# Patient Record
Sex: FEMALE | Race: WHITE | Employment: FULL TIME | URBAN - METROPOLITAN AREA
[De-identification: names, ages, dates, MRNs, and addresses within clinical notes are randomized per-mention and may not be internally consistent; named-entity substitution may affect disease eponyms.]

---

## 2018-12-19 ENCOUNTER — APPOINTMENT (OUTPATIENT)
Dept: CT IMAGING | Age: 58
End: 2018-12-19
Attending: EMERGENCY MEDICINE
Payer: COMMERCIAL

## 2018-12-19 ENCOUNTER — HOSPITAL ENCOUNTER (EMERGENCY)
Age: 58
Discharge: HOME | End: 2018-12-19
Attending: EMERGENCY MEDICINE
Payer: COMMERCIAL

## 2018-12-19 VITALS
SYSTOLIC BLOOD PRESSURE: 162 MMHG | RESPIRATION RATE: 18 BRPM | OXYGEN SATURATION: 99 % | HEART RATE: 70 BPM | BODY MASS INDEX: 25 KG/M2 | WEIGHT: 170 LBS | DIASTOLIC BLOOD PRESSURE: 87 MMHG | TEMPERATURE: 98 F

## 2018-12-19 DIAGNOSIS — S09.90XA MINOR HEAD INJURY, INITIAL ENCOUNTER: Primary | ICD-10-CM

## 2018-12-19 DIAGNOSIS — S06.0X0A CONCUSSION WITHOUT LOSS OF CONSCIOUSNESS, INITIAL ENCOUNTER: ICD-10-CM

## 2018-12-19 PROCEDURE — 99285 EMERGENCY DEPT VISIT HI MDM: CPT

## 2018-12-19 PROCEDURE — 99284 EMERGENCY DEPT VISIT MOD MDM: CPT

## 2018-12-19 PROCEDURE — 93005 ELECTROCARDIOGRAM TRACING: CPT

## 2018-12-19 PROCEDURE — 93010 ELECTROCARDIOGRAM REPORT: CPT

## 2018-12-19 PROCEDURE — 70450 CT HEAD/BRAIN W/O DYE: CPT | Performed by: EMERGENCY MEDICINE

## 2018-12-19 NOTE — ED PROVIDER NOTES
Patient Seen in: [facility] In [place]    History   Patient presents with:  Head Neck Injury (neurologic, musculoskeletal)    Stated Complaint: passed out Monday morning while in the bathroom- hit head on the bathroom floor*    HPI Used    Alcohol use: No    Drug use: No      Review of Systems    Positive for stated complaint: passed out Monday morning while in the bathroom- hit head on the bathroom floor*  Other systems are as noted in HPI. Constitutional and vital signs reviewed. woke up early in the morning with nausea and had to throw up. She had a brief loss of consciousness while running to the bathroom that I suspect was probably orthostatic hypotension she basically just got out of bed and ran from the bathroom.   She had 6 h

## 2018-12-19 NOTE — ED INITIAL ASSESSMENT (HPI)
States on Monday awoke with nausea and vomiting, got up to bathroom and passed out and struck toilet seat and floor, out for a few seconds but since had more vomiting that day but now you have headache, visual changes blurring of vision and just not feelin

## 2019-04-23 ENCOUNTER — NURSE ONLY (OUTPATIENT)
Dept: FAMILY MEDICINE CLINIC | Facility: CLINIC | Age: 59
End: 2019-04-23
Payer: COMMERCIAL

## 2019-04-23 VITALS
RESPIRATION RATE: 16 BRPM | WEIGHT: 173.81 LBS | HEART RATE: 84 BPM | OXYGEN SATURATION: 98 % | DIASTOLIC BLOOD PRESSURE: 84 MMHG | TEMPERATURE: 98 F | SYSTOLIC BLOOD PRESSURE: 130 MMHG | HEIGHT: 69 IN | BODY MASS INDEX: 25.74 KG/M2

## 2019-04-23 DIAGNOSIS — M54.2 NECK PAIN: Primary | ICD-10-CM

## 2019-04-23 PROCEDURE — 99213 OFFICE O/P EST LOW 20 MIN: CPT | Performed by: NURSE PRACTITIONER

## 2019-04-23 RX ORDER — GLIMEPIRIDE 1 MG/1
TABLET ORAL
Refills: 12 | COMMUNITY
Start: 2019-03-23

## 2019-04-23 RX ORDER — CYCLOBENZAPRINE HCL 10 MG
10 TABLET ORAL 3 TIMES DAILY PRN
Qty: 20 TABLET | Refills: 0 | Status: SHIPPED | OUTPATIENT
Start: 2019-04-23 | End: 2019-04-30

## 2019-04-23 NOTE — PATIENT INSTRUCTIONS
General Neck and Back Pain    Both neck and back pain are usually caused by injury to the muscles or ligaments of the spine. Sometimes the disks that separate each bone of the spine may cause pain by pressing on a nearby nerve.  Back and neck pain may rafaela · Poor conditioning, lack of regular exercise  · Spinal disc disease or arthritis  · Stress  · Pregnancy, or illness like appendicitis, bladder or kidney infection, pelvic infections   Home care  · For neck pain: Use a comfortable pillow that supports the · You may use over-the-counter medicine to control pain, unless another pain medicine was prescribed. If you have chronic conditions like diabetes, liver or kidney disease, stomach ulcers,  gastrointestinal bleeding, or are taking blood thinner medicines.

## 2019-04-23 NOTE — PROGRESS NOTES
CHIEF COMPLAINT:     Patient presents with:  Neck Pain: x 18 days      HPI:   Taylor Dong is a 63year old female who is here for complaints of neck pain. Pain is located at bilateral shoulder. Pain is described as dull. Severity is moderate.  The pain • Thyroid disease    • Type II or unspecified type diabetes mellitus without mention of complication, not stated as uncontrolled     [id number]   • Visual impairment       Social History:  Social History    Tobacco Use      Smoking status: Current Some Day Sm • Cyclobenzaprine HCl 10 MG Oral Tab 20 tablet 0     Sig: Take 1 tablet (10 mg total) by mouth 3 (three) times daily as needed for Muscle spasms.          Patient Instructions     General Neck and Back Pain    Both neck and back pain are usually caused by i · Overexertion, lifting, pushing, pulling incorrectly or too aggressively. · Sudden twisting, bending or stretching from an accident (car or fall), or accidental movement.   · Poor posture  · Poor conditioning, lack of regular exercise  · Spinal disc disea · You may use over-the-counter medicine to control pain, unless another pain medicine was prescribed. If you have chronic conditions like diabetes, liver or kidney disease, stomach ulcers,  gastrointestinal bleeding, or are taking blood thinner medicines.

## 2019-07-17 ENCOUNTER — OFFICE VISIT (OUTPATIENT)
Dept: OBGYN CLINIC | Facility: CLINIC | Age: 59
End: 2019-07-17
Payer: COMMERCIAL

## 2019-07-17 VITALS
HEIGHT: 68 IN | WEIGHT: 171 LBS | DIASTOLIC BLOOD PRESSURE: 70 MMHG | SYSTOLIC BLOOD PRESSURE: 118 MMHG | BODY MASS INDEX: 25.91 KG/M2 | HEART RATE: 76 BPM

## 2019-07-17 DIAGNOSIS — Z12.39 BREAST CANCER SCREENING: ICD-10-CM

## 2019-07-17 DIAGNOSIS — L90.0 LICHEN SCLEROSUS: ICD-10-CM

## 2019-07-17 DIAGNOSIS — Z01.419 WELL FEMALE EXAM WITH ROUTINE GYNECOLOGICAL EXAM: ICD-10-CM

## 2019-07-17 DIAGNOSIS — R39.89 URINE TROUBLES: Primary | ICD-10-CM

## 2019-07-17 DIAGNOSIS — Z12.4 CERVICAL CANCER SCREENING: ICD-10-CM

## 2019-07-17 LAB
APPEARANCE: CLEAR
BILIRUBIN: NEGATIVE
GLUCOSE (URINE DIPSTICK): 1000 MG/DL
KETONES (URINE DIPSTICK): NEGATIVE MG/DL
LEUKOCYTES: NEGATIVE
MULTISTIX LOT#: NORMAL NUMERIC
NITRITE, URINE: NEGATIVE
OCCULT BLOOD: NEGATIVE
PH, URINE: 5 (ref 4.5–8)
PROTEIN (URINE DIPSTICK): NEGATIVE MG/DL
SPECIFIC GRAVITY: 1.03 (ref 1–1.03)
UROBILINOGEN,SEMI-QN: 0.2 MG/DL (ref 0–1.9)

## 2019-07-17 PROCEDURE — 81002 URINALYSIS NONAUTO W/O SCOPE: CPT | Performed by: OBSTETRICS & GYNECOLOGY

## 2019-07-17 PROCEDURE — 99386 PREV VISIT NEW AGE 40-64: CPT | Performed by: OBSTETRICS & GYNECOLOGY

## 2019-07-17 PROCEDURE — 87086 URINE CULTURE/COLONY COUNT: CPT | Performed by: OBSTETRICS & GYNECOLOGY

## 2019-07-17 PROCEDURE — 88175 CYTOPATH C/V AUTO FLUID REDO: CPT | Performed by: OBSTETRICS & GYNECOLOGY

## 2019-07-17 PROCEDURE — 87624 HPV HI-RISK TYP POOLED RSLT: CPT | Performed by: OBSTETRICS & GYNECOLOGY

## 2019-07-17 RX ORDER — CLOBETASOL PROPIONATE 0.5 MG/G
1 CREAM TOPICAL
Qty: 30 G | Refills: 3 | Status: SHIPPED | OUTPATIENT
Start: 2019-07-17 | End: 2020-03-09

## 2019-07-17 NOTE — PROGRESS NOTES
GYN H&P     2019  1:02 PM    CC: Patient is here for annual    HPI: patient is a 63year old  here for her annual gyn exam.   She has no complaints. Has hx of lichen sclerosis and requests refill of meds. Also notes some urinary pressure.  Kaleb Fritz PUFFS BY MOUTH FOUR TIMES DAILY AS NEEDED Disp: 1 Inhaler Rfl: 6   Atorvastatin Calcium 10 MG Oral Tab  Disp:  Rfl:    ergocalciferol 79287 UNITS Oral Cap TK ONE C PO ONCE PER WEEK.  Disp:  Rfl: 1   TIM CONTOUR NEXT TEST In Vitro Strip TEST D Disp:  Rfl: meetings of clubs or organizations: Not on file        Relationship status: Not on file      Intimate partner violence:        Fear of current or ex partner: Not on file        Emotionally abused: Not on file        Physically abused: Not on file        Fo Pulse 76   Ht 68\"   Wt 171 lb   BMI 26.00 kg/m²   Exam:   GENERAL: well developed, well nourished, in no apparent distress  SKIN: no rashes, no suspicious lesions  HEENT: normal  NECK: supple; no thyroidmegaly, no adenopathy  LUNGS: clear to auscultation Clobetasol Prop Emollient Base (CLOBETASOL PROPIONATE E) 0.05 % External Cream; Apply 1 Application topically daily as needed.   Dispense: 30 g; Refill: 3      Isamar Rascon MD

## 2019-07-18 LAB — HPV I/H RISK 1 DNA SPEC QL NAA+PROBE: NEGATIVE

## 2020-03-09 ENCOUNTER — TELEPHONE (OUTPATIENT)
Dept: OBGYN CLINIC | Facility: CLINIC | Age: 60
End: 2020-03-09

## 2020-03-09 DIAGNOSIS — L90.0 LICHEN SCLEROSUS: ICD-10-CM

## 2020-03-09 RX ORDER — CLOBETASOL PROPIONATE 0.5 MG/G
1 CREAM TOPICAL
Qty: 30 G | Refills: 0 | Status: SHIPPED | OUTPATIENT
Start: 2020-03-09 | End: 2020-08-06

## 2020-03-09 NOTE — TELEPHONE ENCOUNTER
Last OV: 7/17/19 with Dr. Nurys Nielsen for annual  Last refill date: 7/17/19  Follow-up: 1 year  Next appt. : 6/16/2020      Refill sent.

## 2020-03-09 NOTE — TELEPHONE ENCOUNTER
No medications on file. Patient has not been seen by us before. Contacted patient. Upon further investigation, patient has two accounts. Will have this corrected.  She is requesting a refill on Clobetasol cream. Please see other account for notes etc.

## 2020-03-09 NOTE — TELEPHONE ENCOUNTER
Pt called in today stating she needs a refill before her appt.    Future Appointments   Date Time Provider Sneha Felisha   6/16/2020  2:30 PM Rosy Marina MD [facility] [address]     Pt stated the pharmacy faxed over the request for clovestin

## 2020-08-06 ENCOUNTER — TELEPHONE (OUTPATIENT)
Dept: OBGYN CLINIC | Facility: CLINIC | Age: 60
End: 2020-08-06

## 2020-08-06 DIAGNOSIS — L90.0 LICHEN SCLEROSUS: ICD-10-CM

## 2020-08-06 RX ORDER — CLOBETASOL PROPIONATE 0.5 MG/G
1 CREAM TOPICAL
Qty: 30 G | Refills: 0 | Status: SHIPPED | OUTPATIENT
Start: 2020-08-06 | End: 2020-10-09

## 2020-08-06 NOTE — TELEPHONE ENCOUNTER
Refill request received via fax from  [facility]  for Clobetasol Cream.  Pt has appt scheduled for 9/16/20. Routed to Dr. Shannan Craven. Please advise if OK for refill.

## 2020-10-09 ENCOUNTER — OFFICE VISIT (OUTPATIENT)
Dept: OBGYN CLINIC | Facility: CLINIC | Age: 60
End: 2020-10-09
Payer: COMMERCIAL

## 2020-10-09 VITALS
HEIGHT: 67.5 IN | TEMPERATURE: 98 F | HEART RATE: 88 BPM | DIASTOLIC BLOOD PRESSURE: 80 MMHG | WEIGHT: 164.81 LBS | SYSTOLIC BLOOD PRESSURE: 132 MMHG | BODY MASS INDEX: 25.57 KG/M2

## 2020-10-09 DIAGNOSIS — Z12.31 ENCOUNTER FOR SCREENING MAMMOGRAM FOR BREAST CANCER: ICD-10-CM

## 2020-10-09 DIAGNOSIS — Z01.419 WELL WOMAN EXAM WITH ROUTINE GYNECOLOGICAL EXAM: Primary | ICD-10-CM

## 2020-10-09 DIAGNOSIS — Z12.4 ENCOUNTER FOR SCREENING FOR CERVICAL CANCER: ICD-10-CM

## 2020-10-09 DIAGNOSIS — L90.0 LICHEN SCLEROSUS: ICD-10-CM

## 2020-10-09 PROCEDURE — 99396 PREV VISIT EST AGE 40-64: CPT | Performed by: OBSTETRICS & GYNECOLOGY

## 2020-10-09 PROCEDURE — 3079F DIAST BP 80-89 MM HG: CPT | Performed by: OBSTETRICS & GYNECOLOGY

## 2020-10-09 PROCEDURE — 87624 HPV HI-RISK TYP POOLED RSLT: CPT | Performed by: OBSTETRICS & GYNECOLOGY

## 2020-10-09 PROCEDURE — 3075F SYST BP GE 130 - 139MM HG: CPT | Performed by: OBSTETRICS & GYNECOLOGY

## 2020-10-09 PROCEDURE — 3008F BODY MASS INDEX DOCD: CPT | Performed by: OBSTETRICS & GYNECOLOGY

## 2020-10-09 RX ORDER — PREDNISONE 10 MG/1
TABLET ORAL
COMMUNITY

## 2020-10-09 RX ORDER — CLOBETASOL PROPIONATE 0.5 MG/G
1 CREAM TOPICAL
Qty: 30 G | Refills: 0 | Status: SHIPPED | OUTPATIENT
Start: 2020-10-09 | End: 2021-09-27

## 2020-10-09 RX ORDER — ALPRAZOLAM 0.5 MG/1
TABLET ORAL
COMMUNITY

## 2020-10-09 NOTE — PROGRESS NOTES
[facility]  Obstetrics and Gynecology  History & Physical    CC: Patient presents for a well woman exam     Subjective:     HPI: Kathy Webb is a 62year old  female here for a well women exam. Patient reports no complaints.   The patien 2.5-1000 MG Oral Tab, Take  by mouth., Disp: , Rfl:     •  Montelukast Sodium (SINGULAIR OR), Take 10 mg by mouth daily. , Disp: , Rfl:     No current facility-administered medications on file prior to visit.        All:    Iodine Solution [Po*      [place] file        Minutes per session: Not on file      Stress: Not on file    Relationships      Social connections        Talks on phone: Not on file        Gets together: Not on file        Attends [employer] service: Not on file        Active member of club o Weight: 164 lb 12.8 oz (74.8 kg)   Height: 67.5\"         Body mass index is 25.43 kg/m².     General: AAO.NAD.   CVS exam: normal peripheral perfusion  Chest: non-labored breathing, no tachypnea   Breast: symmetric, no dominant or suspicious mass, no ski of care. All questions were answered to the best of my ability at this time.       RTC in 1 year for a well woman exam or sooner if needed     Marlon Fothergill, MD   [facility] - OBGYN

## 2020-10-19 NOTE — PROGRESS NOTES
Pap smear normal and negative for HPV. Continue annual gynecologic exams. However, please inform patient that vaginitis panel positive for BV. Please inquire if symptomatic. If so, then please treat per protocol.

## 2020-10-19 NOTE — PROGRESS NOTES
Patient notified. Patient verbalized understanding. Pt denies any discharge or odor. Pt will call if any symptoms start.

## 2021-02-03 ENCOUNTER — HOSPITAL ENCOUNTER (OUTPATIENT)
Dept: MAMMOGRAPHY | Age: 61
Discharge: HOME | End: 2021-02-03
Attending: OBSTETRICS & GYNECOLOGY
Payer: COMMERCIAL

## 2021-02-03 DIAGNOSIS — Z12.31 ENCOUNTER FOR SCREENING MAMMOGRAM FOR BREAST CANCER: ICD-10-CM

## 2021-02-03 DIAGNOSIS — Z23 NEED FOR VACCINATION: ICD-10-CM

## 2021-02-03 PROCEDURE — 77067 SCR MAMMO BI INCL CAD: CPT | Performed by: OBSTETRICS & GYNECOLOGY

## 2021-02-03 PROCEDURE — 77063 BREAST TOMOSYNTHESIS BI: CPT | Performed by: OBSTETRICS & GYNECOLOGY

## 2021-02-05 ENCOUNTER — IMMUNIZATION (OUTPATIENT)
Dept: LAB | Age: 61
End: 2021-02-05
Attending: HOSPITALIST
Payer: COMMERCIAL

## 2021-02-05 DIAGNOSIS — Z23 NEED FOR VACCINATION: Primary | ICD-10-CM

## 2021-02-05 PROCEDURE — 0001A SARSCOV2 VAC 30MCG/0.3ML IM: CPT

## 2021-02-19 ENCOUNTER — HOSPITAL ENCOUNTER (OUTPATIENT)
Dept: MAMMOGRAPHY | Age: 61
Discharge: HOME | End: 2021-02-19
Attending: OBSTETRICS & GYNECOLOGY
Payer: COMMERCIAL

## 2021-02-19 ENCOUNTER — TELEPHONE (OUTPATIENT)
Dept: OBGYN CLINIC | Facility: CLINIC | Age: 61
End: 2021-02-19

## 2021-02-19 DIAGNOSIS — R92.2 INCONCLUSIVE MAMMOGRAM: ICD-10-CM

## 2021-02-19 PROCEDURE — 77065 DX MAMMO INCL CAD UNI: CPT | Performed by: OBSTETRICS & GYNECOLOGY

## 2021-02-19 PROCEDURE — 77061 BREAST TOMOSYNTHESIS UNI: CPT | Performed by: OBSTETRICS & GYNECOLOGY

## 2021-02-19 NOTE — IMAGING NOTE
Assisted Dr Macarena Montes with left breast stereo biopsy recommendation, BIRADS 4b. Explained procedure and written instructions given. Pt screened and denies blood thinners, bleeding issues, chemo or liver disease.  Reviewed to eat, take 1000 mg of acetaminophen,

## 2021-02-19 NOTE — TELEPHONE ENCOUNTER
Call from Dr. Yvette Bcaa, radiology. Recommending left breast stereotactic biopsy. Pt has been informed by radiologist.    Routed to Dr. Rajni Sosa. Please advise if OK to place order.

## 2021-02-26 ENCOUNTER — IMMUNIZATION (OUTPATIENT)
Dept: LAB | Age: 61
End: 2021-02-26
Attending: HOSPITALIST
Payer: COMMERCIAL

## 2021-02-26 DIAGNOSIS — Z23 NEED FOR VACCINATION: Primary | ICD-10-CM

## 2021-02-26 PROCEDURE — 0002A SARSCOV2 VAC 30MCG/0.3ML IM: CPT

## 2021-03-01 ENCOUNTER — HOSPITAL ENCOUNTER (OUTPATIENT)
Dept: MAMMOGRAPHY | Facility: HOSPITAL | Age: 61
Discharge: HOME | End: 2021-03-01
Attending: OBSTETRICS & GYNECOLOGY
Payer: COMMERCIAL

## 2021-03-01 ENCOUNTER — HOSPITAL ENCOUNTER (OUTPATIENT)
Dept: MAMMOGRAPHY | Facility: HOSPITAL | Age: 61
End: 2021-03-01
Attending: OBSTETRICS & GYNECOLOGY
Payer: COMMERCIAL

## 2021-03-01 DIAGNOSIS — R92.1 BREAST CALCIFICATIONS: ICD-10-CM

## 2021-03-01 PROCEDURE — 88305 TISSUE EXAM BY PATHOLOGIST: CPT | Performed by: OBSTETRICS & GYNECOLOGY

## 2021-03-01 PROCEDURE — 19081 BX BREAST 1ST LESION STRTCTC: CPT | Performed by: OBSTETRICS & GYNECOLOGY

## 2021-03-02 ENCOUNTER — TELEPHONE (OUTPATIENT)
Dept: MAMMOGRAPHY | Facility: HOSPITAL | Age: 61
End: 2021-03-02

## 2021-03-02 NOTE — TELEPHONE ENCOUNTER
Telephoned [facility] Em and name,  verified with patient. Notified [facility] Em of left breast negative for malignancy biopsy result. Concordance pending. [facility] Em reports biopsy site is healing well.   Radiologist recommends next mammogram is

## 2021-03-02 NOTE — PROGRESS NOTES
Findings are benign. However, refer to Dr. Marbella Mckinley for consultation. Please order and referral information.

## 2021-08-10 ENCOUNTER — TELEPHONE (OUTPATIENT)
Dept: OBGYN CLINIC | Facility: CLINIC | Age: 61
End: 2021-08-10

## 2021-08-10 DIAGNOSIS — R92.8 FOLLOW-UP EXAMINATION OF ABNORMAL MAMMOGRAM: Primary | ICD-10-CM

## 2021-08-10 NOTE — TELEPHONE ENCOUNTER
Pt notified mammo ordered.     appt scheduled to address skin issue  Future Appointments   Date Time Provider Sneha Baeza   8/12/2021 12:00 PM Meghna Jacobs, APN [facility] [address]

## 2021-08-10 NOTE — TELEPHONE ENCOUNTER
Patient called and needs 6 month follow up orders for breast US/mammo. And 15 or so years ago patient had a problem with thin skin and cracking.   Dr. Aliza Israel told her to call the office if/when it happens again to get a RX sent over to help with healin

## 2021-08-12 ENCOUNTER — OFFICE VISIT (OUTPATIENT)
Dept: OBGYN CLINIC | Facility: CLINIC | Age: 61
End: 2021-08-12
Payer: COMMERCIAL

## 2021-08-12 VITALS
SYSTOLIC BLOOD PRESSURE: 124 MMHG | DIASTOLIC BLOOD PRESSURE: 78 MMHG | HEART RATE: 82 BPM | WEIGHT: 168 LBS | BODY MASS INDEX: 26 KG/M2

## 2021-08-12 DIAGNOSIS — N76.0 VAGINITIS AND VULVOVAGINITIS: Primary | ICD-10-CM

## 2021-08-12 PROCEDURE — 3078F DIAST BP <80 MM HG: CPT | Performed by: NURSE PRACTITIONER

## 2021-08-12 PROCEDURE — 87660 TRICHOMONAS VAGIN DIR PROBE: CPT | Performed by: NURSE PRACTITIONER

## 2021-08-12 PROCEDURE — 99213 OFFICE O/P EST LOW 20 MIN: CPT | Performed by: NURSE PRACTITIONER

## 2021-08-12 PROCEDURE — 3074F SYST BP LT 130 MM HG: CPT | Performed by: NURSE PRACTITIONER

## 2021-08-12 PROCEDURE — 87480 CANDIDA DNA DIR PROBE: CPT | Performed by: NURSE PRACTITIONER

## 2021-08-12 PROCEDURE — 87510 GARDNER VAG DNA DIR PROBE: CPT | Performed by: NURSE PRACTITIONER

## 2021-08-12 RX ORDER — ATORVASTATIN CALCIUM 20 MG/1
20 TABLET, FILM COATED ORAL DAILY
COMMUNITY
Start: 2021-08-03

## 2021-08-12 NOTE — PROGRESS NOTES
Gyne note     S: patient is a 62year old yo  here for a flare up of vulvar irritation. She has a history of Lichen Sclerosis and has been using the Clobetasol 2-3 nights weekly as directed from prior diagnosis.  The last week+ her skin has flared up

## 2021-08-12 NOTE — PATIENT INSTRUCTIONS
Use the Clobetasol nightly  Apply a thin layer of Desitin in the morning  Don't use soap to clean, just warm water and dry area by patting gently  Sleep without undergarments

## 2021-09-09 ENCOUNTER — HOSPITAL ENCOUNTER (OUTPATIENT)
Dept: MAMMOGRAPHY | Age: 61
Discharge: HOME | End: 2021-09-09
Attending: OBSTETRICS & GYNECOLOGY
Payer: COMMERCIAL

## 2021-09-09 DIAGNOSIS — R92.8 FOLLOW-UP EXAMINATION OF ABNORMAL MAMMOGRAM: ICD-10-CM

## 2021-09-09 PROCEDURE — 77065 DX MAMMO INCL CAD UNI: CPT | Performed by: OBSTETRICS & GYNECOLOGY

## 2021-09-09 PROCEDURE — 77061 BREAST TOMOSYNTHESIS UNI: CPT | Performed by: OBSTETRICS & GYNECOLOGY

## 2021-09-27 DIAGNOSIS — L90.0 LICHEN SCLEROSUS: ICD-10-CM

## 2021-09-28 RX ORDER — CLOBETASOL PROPIONATE 0.5 MG/G
1 CREAM TOPICAL
Qty: 30 G | Refills: 0 | Status: SHIPPED | OUTPATIENT
Start: 2021-09-28

## 2022-02-08 DIAGNOSIS — L90.0 LICHEN SCLEROSUS: ICD-10-CM

## 2022-02-09 RX ORDER — CLOBETASOL PROPIONATE 0.5 MG/G
EMULSION TOPICAL
Qty: 30 G | Refills: 0 | Status: SHIPPED | OUTPATIENT
Start: 2022-02-09 | End: 2023-03-17

## 2022-02-09 NOTE — TELEPHONE ENCOUNTER
Called pt to review clobetasol since it was increased. Pt was to make a followup appt after her appt on 8/12/21 and did not. PSR please help her schedule annual appt. And then route back to nurse to talk to her about refilling  medication.

## 2022-02-09 NOTE — TELEPHONE ENCOUNTER
Future Appointments   Date Time Provider Sneha Baeza   4/8/2022 11:30 AM Ibeth Zacarias MD [facility] [address]

## 2022-04-08 ENCOUNTER — OFFICE VISIT (OUTPATIENT)
Dept: OBGYN CLINIC | Facility: CLINIC | Age: 62
End: 2022-04-08
Payer: COMMERCIAL

## 2022-04-08 VITALS
BODY MASS INDEX: 25.31 KG/M2 | WEIGHT: 167 LBS | HEIGHT: 68 IN | DIASTOLIC BLOOD PRESSURE: 80 MMHG | SYSTOLIC BLOOD PRESSURE: 122 MMHG | HEART RATE: 66 BPM

## 2022-04-08 DIAGNOSIS — Z12.31 ENCOUNTER FOR SCREENING MAMMOGRAM FOR BREAST CANCER: ICD-10-CM

## 2022-04-08 DIAGNOSIS — Z01.419 WELL WOMAN EXAM WITH ROUTINE GYNECOLOGICAL EXAM: Primary | ICD-10-CM

## 2022-04-08 DIAGNOSIS — L90.0 LICHEN SCLEROSUS: ICD-10-CM

## 2022-04-08 PROCEDURE — 3074F SYST BP LT 130 MM HG: CPT | Performed by: OBSTETRICS & GYNECOLOGY

## 2022-04-08 PROCEDURE — 3079F DIAST BP 80-89 MM HG: CPT | Performed by: OBSTETRICS & GYNECOLOGY

## 2022-04-08 PROCEDURE — 99396 PREV VISIT EST AGE 40-64: CPT | Performed by: OBSTETRICS & GYNECOLOGY

## 2022-04-08 PROCEDURE — 3008F BODY MASS INDEX DOCD: CPT | Performed by: OBSTETRICS & GYNECOLOGY

## 2022-04-08 RX ORDER — MONTELUKAST SODIUM 10 MG/1
1 TABLET ORAL DAILY
COMMUNITY

## 2022-04-08 RX ORDER — MOMETASONE FUROATE 1 MG/G
1 CREAM TOPICAL DAILY
Qty: 50 G | Refills: 1 | Status: SHIPPED | OUTPATIENT
Start: 2022-04-08

## 2022-04-08 RX ORDER — LEVOTHYROXINE SODIUM ANHYDROUS 100 UG/5ML
INJECTION, POWDER, LYOPHILIZED, FOR SOLUTION INTRAVENOUS
COMMUNITY

## 2022-04-15 ENCOUNTER — APPOINTMENT (OUTPATIENT)
Dept: CT IMAGING | Age: 62
End: 2022-04-15
Attending: EMERGENCY MEDICINE
Payer: COMMERCIAL

## 2022-04-15 ENCOUNTER — HOSPITAL ENCOUNTER (EMERGENCY)
Age: 62
Discharge: HOME | End: 2022-04-15
Attending: EMERGENCY MEDICINE
Payer: COMMERCIAL

## 2022-04-15 VITALS
SYSTOLIC BLOOD PRESSURE: 143 MMHG | TEMPERATURE: 97 F | RESPIRATION RATE: 16 BRPM | BODY MASS INDEX: 25.46 KG/M2 | HEIGHT: 68 IN | HEART RATE: 74 BPM | WEIGHT: 168 LBS | DIASTOLIC BLOOD PRESSURE: 72 MMHG | OXYGEN SATURATION: 100 %

## 2022-04-15 DIAGNOSIS — N20.1 URETEROLITHIASIS: Primary | ICD-10-CM

## 2022-04-15 LAB
ALBUMIN SERPL-MCNC: 3.9 G/DL (ref 3.4–5)
ALBUMIN/GLOB SERPL: 1.3 {RATIO} (ref 1–2)
ALP LIVER SERPL-CCNC: 77 U/L
ALT SERPL-CCNC: 41 U/L
ANION GAP SERPL CALC-SCNC: 9 MMOL/L (ref 0–18)
AST SERPL-CCNC: 22 U/L (ref 15–37)
BASOPHILS # BLD AUTO: 0.07 X10(3) UL (ref 0–0.2)
BASOPHILS NFR BLD AUTO: 0.7 %
BILIRUB SERPL-MCNC: 0.4 MG/DL (ref 0.1–2)
BILIRUB UR QL STRIP.AUTO: NEGATIVE
BUN BLD-MCNC: 13 MG/DL (ref 7–18)
CALCIUM BLD-MCNC: 9.4 MG/DL (ref 8.5–10.1)
CHLORIDE SERPL-SCNC: 106 MMOL/L (ref 98–112)
CO2 SERPL-SCNC: 23 MMOL/L (ref 21–32)
COLOR UR AUTO: YELLOW
CREAT BLD-MCNC: 0.85 MG/DL
EOSINOPHIL # BLD AUTO: 0.28 X10(3) UL (ref 0–0.7)
EOSINOPHIL NFR BLD AUTO: 2.9 %
ERYTHROCYTE [DISTWIDTH] IN BLOOD BY AUTOMATED COUNT: 13.3 %
GLOBULIN PLAS-MCNC: 3.1 G/DL (ref 2.8–4.4)
GLUCOSE BLD-MCNC: 196 MG/DL (ref 70–99)
GLUCOSE UR STRIP.AUTO-MCNC: >=1000 MG/DL
HCT VFR BLD AUTO: 48.2 %
HGB BLD-MCNC: 16.2 G/DL
IMM GRANULOCYTES # BLD AUTO: 0.08 X10(3) UL (ref 0–1)
IMM GRANULOCYTES NFR BLD: 0.8 %
KETONES UR STRIP.AUTO-MCNC: NEGATIVE MG/DL
LEUKOCYTE ESTERASE UR QL STRIP.AUTO: NEGATIVE
LYMPHOCYTES # BLD AUTO: 2.51 X10(3) UL (ref 1–4)
LYMPHOCYTES NFR BLD AUTO: 25.6 %
MCH RBC QN AUTO: 29.2 PG (ref 26–34)
MCHC RBC AUTO-ENTMCNC: 33.6 G/DL (ref 31–37)
MCV RBC AUTO: 87 FL
MONOCYTES # BLD AUTO: 0.69 X10(3) UL (ref 0.1–1)
MONOCYTES NFR BLD AUTO: 7 %
NEUTROPHILS # BLD AUTO: 6.17 X10 (3) UL (ref 1.5–7.7)
NEUTROPHILS # BLD AUTO: 6.17 X10(3) UL (ref 1.5–7.7)
NEUTROPHILS NFR BLD AUTO: 63 %
NITRITE UR QL STRIP.AUTO: NEGATIVE
OSMOLALITY SERPL CALC.SUM OF ELEC: 292 MOSM/KG (ref 275–295)
PH UR STRIP.AUTO: 6.5 [PH] (ref 5–8)
PLATELET # BLD AUTO: 247 10(3)UL (ref 150–450)
POTASSIUM SERPL-SCNC: 3.6 MMOL/L (ref 3.5–5.1)
PROT SERPL-MCNC: 7 G/DL (ref 6.4–8.2)
PROT UR STRIP.AUTO-MCNC: NEGATIVE MG/DL
RBC # BLD AUTO: 5.54 X10(6)UL
RBC #/AREA URNS AUTO: >10 /HPF
SODIUM SERPL-SCNC: 138 MMOL/L (ref 136–145)
SP GR UR STRIP.AUTO: 1.01 (ref 1–1.03)
UROBILINOGEN UR STRIP.AUTO-MCNC: 0.2 MG/DL
WBC # BLD AUTO: 9.8 X10(3) UL (ref 4–11)

## 2022-04-15 PROCEDURE — 99284 EMERGENCY DEPT VISIT MOD MDM: CPT

## 2022-04-15 PROCEDURE — 96375 TX/PRO/DX INJ NEW DRUG ADDON: CPT

## 2022-04-15 PROCEDURE — 81001 URINALYSIS AUTO W/SCOPE: CPT | Performed by: EMERGENCY MEDICINE

## 2022-04-15 PROCEDURE — 81015 MICROSCOPIC EXAM OF URINE: CPT | Performed by: EMERGENCY MEDICINE

## 2022-04-15 PROCEDURE — 85025 COMPLETE CBC W/AUTO DIFF WBC: CPT | Performed by: EMERGENCY MEDICINE

## 2022-04-15 PROCEDURE — 74176 CT ABD & PELVIS W/O CONTRAST: CPT | Performed by: EMERGENCY MEDICINE

## 2022-04-15 PROCEDURE — 80053 COMPREHEN METABOLIC PANEL: CPT | Performed by: EMERGENCY MEDICINE

## 2022-04-15 PROCEDURE — 96374 THER/PROPH/DIAG INJ IV PUSH: CPT

## 2022-04-15 RX ORDER — LEVOTHYROXINE SODIUM 0.1 MG/1
100 TABLET ORAL
COMMUNITY

## 2022-04-15 RX ORDER — HYDROCODONE BITARTRATE AND ACETAMINOPHEN 5; 325 MG/1; MG/1
1 TABLET ORAL ONCE
Status: COMPLETED | OUTPATIENT
Start: 2022-04-15 | End: 2022-04-15

## 2022-04-15 RX ORDER — KETOROLAC TROMETHAMINE 15 MG/ML
15 INJECTION, SOLUTION INTRAMUSCULAR; INTRAVENOUS ONCE
Status: COMPLETED | OUTPATIENT
Start: 2022-04-15 | End: 2022-04-15

## 2022-04-15 RX ORDER — ONDANSETRON 2 MG/ML
4 INJECTION INTRAMUSCULAR; INTRAVENOUS ONCE
Status: COMPLETED | OUTPATIENT
Start: 2022-04-15 | End: 2022-04-15

## 2022-04-15 RX ORDER — HYDROCODONE BITARTRATE AND ACETAMINOPHEN 5; 325 MG/1; MG/1
1 TABLET ORAL EVERY 4 HOURS PRN
Qty: 10 TABLET | Refills: 0 | Status: SHIPPED | OUTPATIENT
Start: 2022-04-15 | End: 2022-04-17

## 2022-10-07 ENCOUNTER — APPOINTMENT (OUTPATIENT)
Dept: GENERAL RADIOLOGY | Age: 62
End: 2022-10-07
Attending: EMERGENCY MEDICINE
Payer: COMMERCIAL

## 2022-10-07 ENCOUNTER — HOSPITAL ENCOUNTER (EMERGENCY)
Age: 62
Discharge: HOME | End: 2022-10-07
Attending: EMERGENCY MEDICINE
Payer: COMMERCIAL

## 2022-10-07 ENCOUNTER — OFFICE VISIT (OUTPATIENT)
Dept: FAMILY MEDICINE CLINIC | Facility: CLINIC | Age: 62
End: 2022-10-07
Payer: COMMERCIAL

## 2022-10-07 ENCOUNTER — HOSPITAL ENCOUNTER (OUTPATIENT)
Dept: MAMMOGRAPHY | Age: 62
Discharge: HOME | End: 2022-10-07
Attending: OBSTETRICS & GYNECOLOGY
Payer: COMMERCIAL

## 2022-10-07 VITALS
HEIGHT: 68 IN | WEIGHT: 168 LBS | SYSTOLIC BLOOD PRESSURE: 162 MMHG | HEART RATE: 78 BPM | TEMPERATURE: 98 F | DIASTOLIC BLOOD PRESSURE: 75 MMHG | BODY MASS INDEX: 25.46 KG/M2 | RESPIRATION RATE: 18 BRPM | OXYGEN SATURATION: 98 %

## 2022-10-07 VITALS
DIASTOLIC BLOOD PRESSURE: 86 MMHG | SYSTOLIC BLOOD PRESSURE: 152 MMHG | HEIGHT: 68 IN | WEIGHT: 169 LBS | RESPIRATION RATE: 20 BRPM | OXYGEN SATURATION: 98 % | HEART RATE: 88 BPM | TEMPERATURE: 97 F | BODY MASS INDEX: 25.61 KG/M2

## 2022-10-07 DIAGNOSIS — R06.00 DYSPNEA, UNSPECIFIED TYPE: Primary | ICD-10-CM

## 2022-10-07 DIAGNOSIS — Z12.31 ENCOUNTER FOR SCREENING MAMMOGRAM FOR BREAST CANCER: ICD-10-CM

## 2022-10-07 DIAGNOSIS — Z02.9 ADMINISTRATIVE ENCOUNTER: Primary | ICD-10-CM

## 2022-10-07 LAB — SARS-COV-2 RNA RESP QL NAA+PROBE: NOT DETECTED

## 2022-10-07 PROCEDURE — 96374 THER/PROPH/DIAG INJ IV PUSH: CPT

## 2022-10-07 PROCEDURE — 99284 EMERGENCY DEPT VISIT MOD MDM: CPT

## 2022-10-07 PROCEDURE — 3077F SYST BP >= 140 MM HG: CPT | Performed by: NURSE PRACTITIONER

## 2022-10-07 PROCEDURE — 71045 X-RAY EXAM CHEST 1 VIEW: CPT | Performed by: EMERGENCY MEDICINE

## 2022-10-07 PROCEDURE — 3079F DIAST BP 80-89 MM HG: CPT | Performed by: NURSE PRACTITIONER

## 2022-10-07 PROCEDURE — 3008F BODY MASS INDEX DOCD: CPT | Performed by: NURSE PRACTITIONER

## 2022-10-07 PROCEDURE — 77067 SCR MAMMO BI INCL CAD: CPT | Performed by: OBSTETRICS & GYNECOLOGY

## 2022-10-07 PROCEDURE — 96361 HYDRATE IV INFUSION ADD-ON: CPT

## 2022-10-07 PROCEDURE — 77063 BREAST TOMOSYNTHESIS BI: CPT | Performed by: OBSTETRICS & GYNECOLOGY

## 2022-10-07 RX ORDER — SODIUM CHLORIDE 9 MG/ML
INJECTION, SOLUTION INTRAVENOUS ONCE
Status: COMPLETED | OUTPATIENT
Start: 2022-10-07 | End: 2022-10-07

## 2022-10-07 RX ORDER — DOXYCYCLINE HYCLATE 100 MG
100 TABLET ORAL 2 TIMES DAILY
COMMUNITY
Start: 2022-10-04

## 2022-10-07 RX ORDER — PREDNISONE 1 MG/1
TABLET ORAL
Qty: 13 TABLET | Refills: 0 | Status: SHIPPED | OUTPATIENT
Start: 2022-10-10 | End: 2022-10-14

## 2022-10-07 RX ORDER — PREDNISONE 10 MG/1
TABLET ORAL
COMMUNITY
Start: 2022-10-04

## 2022-10-07 RX ORDER — ALBUTEROL SULFATE 2.5 MG/3ML
2.5 SOLUTION RESPIRATORY (INHALATION) EVERY 4 HOURS PRN
Qty: 30 EACH | Refills: 0 | Status: SHIPPED | OUTPATIENT
Start: 2022-10-07 | End: 2022-11-06

## 2022-10-07 RX ORDER — METHYLPREDNISOLONE SODIUM SUCCINATE 125 MG/2ML
80 INJECTION, POWDER, LYOPHILIZED, FOR SOLUTION INTRAMUSCULAR; INTRAVENOUS ONCE
Status: COMPLETED | OUTPATIENT
Start: 2022-10-07 | End: 2022-10-07

## 2022-10-07 RX ORDER — ALBUTEROL SULFATE 90 UG/1
2 AEROSOL, METERED RESPIRATORY (INHALATION) EVERY 4 HOURS PRN
Qty: 1 EACH | Refills: 0 | Status: SHIPPED | OUTPATIENT
Start: 2022-10-07 | End: 2022-11-06

## 2022-10-07 NOTE — ED QUICK NOTES
Cough cold symptoms on Monday. wass een by her PCP on Tues, given prednisone and antibiotic for Bronchitis and ear infection. States nott feeling better. Increased SOB. Had neb tx at home.

## 2022-12-27 ENCOUNTER — OFFICE VISIT (OUTPATIENT)
Dept: FAMILY MEDICINE CLINIC | Facility: CLINIC | Age: 62
End: 2022-12-27
Payer: COMMERCIAL

## 2022-12-27 VITALS
RESPIRATION RATE: 18 BRPM | TEMPERATURE: 98 F | WEIGHT: 167 LBS | DIASTOLIC BLOOD PRESSURE: 84 MMHG | OXYGEN SATURATION: 98 % | HEART RATE: 87 BPM | SYSTOLIC BLOOD PRESSURE: 149 MMHG | HEIGHT: 68 IN | BODY MASS INDEX: 25.31 KG/M2

## 2022-12-27 DIAGNOSIS — J11.1 INFLUENZA-LIKE ILLNESS: ICD-10-CM

## 2022-12-27 DIAGNOSIS — R50.9 FEVER, UNSPECIFIED FEVER CAUSE: Primary | ICD-10-CM

## 2022-12-27 PROCEDURE — 87637 SARSCOV2&INF A&B&RSV AMP PRB: CPT | Performed by: NURSE PRACTITIONER

## 2022-12-27 PROCEDURE — 3077F SYST BP >= 140 MM HG: CPT | Performed by: NURSE PRACTITIONER

## 2022-12-27 PROCEDURE — 3008F BODY MASS INDEX DOCD: CPT | Performed by: NURSE PRACTITIONER

## 2022-12-27 PROCEDURE — 99213 OFFICE O/P EST LOW 20 MIN: CPT | Performed by: NURSE PRACTITIONER

## 2022-12-27 PROCEDURE — 3079F DIAST BP 80-89 MM HG: CPT | Performed by: NURSE PRACTITIONER

## 2022-12-27 RX ORDER — LEVOTHYROXINE SODIUM 0.07 MG/1
75 TABLET ORAL DAILY
COMMUNITY
Start: 2022-12-09

## 2022-12-27 RX ORDER — GLIMEPIRIDE 2 MG/1
2 TABLET ORAL DAILY
COMMUNITY
Start: 2022-10-16

## 2022-12-27 RX ORDER — SITAGLIPTIN 100 MG/1
100 TABLET, FILM COATED ORAL DAILY
COMMUNITY
Start: 2022-12-08

## 2022-12-27 RX ORDER — EMPAGLIFLOZIN 25 MG/1
TABLET, FILM COATED ORAL
COMMUNITY
Start: 2022-01-10

## 2022-12-27 RX ORDER — FLUTICASONE PROPIONATE AND SALMETEROL 50; 100 UG/1; UG/1
1 POWDER RESPIRATORY (INHALATION) 2 TIMES DAILY
COMMUNITY
Start: 2022-12-09

## 2022-12-28 LAB
FLUAV + FLUBV RNA SPEC NAA+PROBE: DETECTED
FLUAV + FLUBV RNA SPEC NAA+PROBE: NOT DETECTED
RSV RNA SPEC NAA+PROBE: NOT DETECTED
SARS-COV-2 RNA RESP QL NAA+PROBE: NOT DETECTED

## 2023-03-17 ENCOUNTER — OFFICE VISIT (OUTPATIENT)
Dept: OBGYN CLINIC | Facility: CLINIC | Age: 63
End: 2023-03-17
Payer: COMMERCIAL

## 2023-03-17 VITALS
HEIGHT: 68 IN | SYSTOLIC BLOOD PRESSURE: 118 MMHG | BODY MASS INDEX: 25.76 KG/M2 | HEART RATE: 83 BPM | DIASTOLIC BLOOD PRESSURE: 76 MMHG | WEIGHT: 170 LBS

## 2023-03-17 DIAGNOSIS — L90.0 LICHEN SCLEROSUS: Primary | ICD-10-CM

## 2023-03-17 DIAGNOSIS — N90.89 VULVAR LESION: ICD-10-CM

## 2023-03-17 PROCEDURE — 3008F BODY MASS INDEX DOCD: CPT | Performed by: OBSTETRICS & GYNECOLOGY

## 2023-03-17 PROCEDURE — 3078F DIAST BP <80 MM HG: CPT | Performed by: OBSTETRICS & GYNECOLOGY

## 2023-03-17 PROCEDURE — 99214 OFFICE O/P EST MOD 30 MIN: CPT | Performed by: OBSTETRICS & GYNECOLOGY

## 2023-03-17 PROCEDURE — 88305 TISSUE EXAM BY PATHOLOGIST: CPT | Performed by: OBSTETRICS & GYNECOLOGY

## 2023-03-17 PROCEDURE — 56605 BIOPSY OF VULVA/PERINEUM: CPT | Performed by: OBSTETRICS & GYNECOLOGY

## 2023-03-17 PROCEDURE — 3074F SYST BP LT 130 MM HG: CPT | Performed by: OBSTETRICS & GYNECOLOGY

## 2023-03-17 RX ORDER — ESTRADIOL 0.1 MG/G
1 CREAM VAGINAL
Qty: 42.5 G | Refills: 1 | Status: SHIPPED | OUTPATIENT
Start: 2023-03-20

## 2023-03-17 NOTE — PROCEDURES
Vulvar Biopsy Procedure Note    Indications: 59year old y/o female with vulvar lesion and vulvar irritation. Hx of lichen sclerosus. Pre-procedure diagnosis:   Vulvar lesion  Vulvar irritation   lichen sclerosus    Post-procedure diagnosis:  Vulvar lesion  Vulvar irritation   lichen sclerosus    Procedure:  Vulvar Biopsy     Procedure Details:   A discussion was held with patient including diagnosis, prognosis and management. Recommendation made for colposcopy with possible biopsies. Risks, benefits and alteratives were discussed with the patient. The patient was agreed to proceed with procedure. She provided written and verbal consent. The patient was positioned supine and in stir-ups. The vulvar was thoroughly examined and patient identified the most symptomatic site. The vulva was noted for erythematous, edematous and excoriation lesion noted with extension towards perineum. The identified biopsy site was then prepped with iodine. The site was infiltrated with 3 ml of Lidocaine. After confirmation of adequate anesthesia,  a punch biopsy was then performed at the indicated site. Please refer to image below. A biopsy was then collected at each indicated lesion site. All tissue were placed into the designated specimen containers and collected to be sent to pathology. The biopsy site was treated with silver nitrate. Good hemostasis noted. The patient was advised to keep the biopsy site clean and dry. She may use soap and water over site including sitz baths as needed. Patient instructed to place small amount of Vaseline if irritation with clothing noted. Patient advised to return in 2 weeks for follow up if suture remains. Impression: vulvar lesions pending final pathology     Disposition: Stable.  RTC in 6 weeks for follow up     Jad Rodriguez MD   [facility] - [facility]        Note to patient and family   The [facility] makes medical notes available to patients in the interest of transparency. However, please be advised that this is a medical document. It is intended as [id number] communication. It is written and medical language may contain abbreviations or verbiage that are technical and unfamiliar. It may appear blunt or direct. Medical documents are intended to carry relevant information, facts as evident, and the clinical opinion of the practitioner.

## 2023-03-17 NOTE — PATIENT INSTRUCTIONS
estradiol 0.1 MG/GM Vaginal Cream 42.5 g 1 3/20/2023    Sig:   Place 1 g vaginally twice a week. Apply nightly for 6 weeks followed by twice a week for maintenance therapy     Route:   Vaginal         halobetasol 0.05 % External Cream 50 g 1 3/17/2023    Sig:   Apply 1 g topically at bedtime.  Apply night for 4 weeks followed by every other night for 2 weeks followed by twice a week for maintenance therapy

## 2023-03-20 ENCOUNTER — TELEPHONE (OUTPATIENT)
Dept: OBGYN CLINIC | Facility: CLINIC | Age: 63
End: 2023-03-20

## 2023-03-21 RX ORDER — FLUCONAZOLE 150 MG/1
TABLET ORAL
Qty: 2 TABLET | Refills: 0 | Status: SHIPPED | OUTPATIENT
Start: 2023-03-21

## 2023-03-21 RX ORDER — AZITHROMYCIN 500 MG/1
TABLET, FILM COATED ORAL
Qty: 2 TABLET | Refills: 0 | Status: SHIPPED | OUTPATIENT
Start: 2023-03-21 | End: 2023-03-25

## 2023-03-21 NOTE — TELEPHONE ENCOUNTER
Please notify patient that vaginal ID swab reviewed. Noted for Ureaplasma. Noted for yeast.  Recommend treatment with azithromycin 1 g x 1 dose and fluconazole 150 mg x 1 dose but may repeat fluconazole in 72 hours. Please provide instructions and prescription. Thank you.

## 2023-05-10 ENCOUNTER — OFFICE VISIT (OUTPATIENT)
Dept: OBGYN CLINIC | Facility: CLINIC | Age: 63
End: 2023-05-10
Payer: COMMERCIAL

## 2023-05-10 VITALS
WEIGHT: 170.25 LBS | HEIGHT: 68 IN | SYSTOLIC BLOOD PRESSURE: 118 MMHG | DIASTOLIC BLOOD PRESSURE: 76 MMHG | HEART RATE: 75 BPM | BODY MASS INDEX: 25.8 KG/M2

## 2023-05-10 DIAGNOSIS — B37.9 YEAST INFECTION: Primary | ICD-10-CM

## 2023-05-10 DIAGNOSIS — L90.0 LICHEN SCLEROSUS: ICD-10-CM

## 2023-05-10 PROCEDURE — 3074F SYST BP LT 130 MM HG: CPT | Performed by: OBSTETRICS & GYNECOLOGY

## 2023-05-10 PROCEDURE — 3078F DIAST BP <80 MM HG: CPT | Performed by: OBSTETRICS & GYNECOLOGY

## 2023-05-10 PROCEDURE — 99214 OFFICE O/P EST MOD 30 MIN: CPT | Performed by: OBSTETRICS & GYNECOLOGY

## 2023-05-10 PROCEDURE — 3008F BODY MASS INDEX DOCD: CPT | Performed by: OBSTETRICS & GYNECOLOGY

## 2023-05-10 RX ORDER — NYSTATIN 100000 U/G
1 CREAM TOPICAL 2 TIMES DAILY
Qty: 30 G | Refills: 1 | Status: SHIPPED | OUTPATIENT
Start: 2023-05-10

## 2023-05-11 PROBLEM — B37.9 YEAST INFECTION: Status: ACTIVE | Noted: 2023-05-11

## 2023-08-10 ENCOUNTER — OFFICE VISIT (OUTPATIENT)
Dept: OBGYN CLINIC | Facility: CLINIC | Age: 63
End: 2023-08-10
Payer: COMMERCIAL

## 2023-08-10 VITALS
DIASTOLIC BLOOD PRESSURE: 64 MMHG | BODY MASS INDEX: 25.76 KG/M2 | HEIGHT: 68 IN | SYSTOLIC BLOOD PRESSURE: 114 MMHG | WEIGHT: 170 LBS

## 2023-08-10 DIAGNOSIS — Z12.4 SCREENING FOR MALIGNANT NEOPLASM OF CERVIX: ICD-10-CM

## 2023-08-10 DIAGNOSIS — Z12.31 ENCOUNTER FOR SCREENING MAMMOGRAM FOR MALIGNANT NEOPLASM OF BREAST: ICD-10-CM

## 2023-08-10 DIAGNOSIS — Z11.51 ENCOUNTER FOR SCREENING FOR HUMAN PAPILLOMAVIRUS (HPV): ICD-10-CM

## 2023-08-10 DIAGNOSIS — Z01.419 WELL WOMAN EXAM WITH ROUTINE GYNECOLOGICAL EXAM: Primary | ICD-10-CM

## 2023-08-10 PROCEDURE — 3078F DIAST BP <80 MM HG: CPT | Performed by: OBSTETRICS & GYNECOLOGY

## 2023-08-10 PROCEDURE — 99396 PREV VISIT EST AGE 40-64: CPT | Performed by: OBSTETRICS & GYNECOLOGY

## 2023-08-10 PROCEDURE — 3074F SYST BP LT 130 MM HG: CPT | Performed by: OBSTETRICS & GYNECOLOGY

## 2023-08-10 PROCEDURE — 3008F BODY MASS INDEX DOCD: CPT | Performed by: OBSTETRICS & GYNECOLOGY

## 2023-08-28 LAB — HPV I/H RISK 1 DNA SPEC QL NAA+PROBE: NEGATIVE

## 2023-10-12 ENCOUNTER — HOSPITAL ENCOUNTER (OUTPATIENT)
Dept: MAMMOGRAPHY | Age: 63
Discharge: HOME | End: 2023-10-12
Attending: OBSTETRICS & GYNECOLOGY
Payer: COMMERCIAL

## 2023-10-12 DIAGNOSIS — Z12.31 ENCOUNTER FOR SCREENING MAMMOGRAM FOR MALIGNANT NEOPLASM OF BREAST: ICD-10-CM

## 2023-10-12 PROCEDURE — 77067 SCR MAMMO BI INCL CAD: CPT | Performed by: OBSTETRICS & GYNECOLOGY

## 2023-10-12 PROCEDURE — 77063 BREAST TOMOSYNTHESIS BI: CPT | Performed by: OBSTETRICS & GYNECOLOGY

## 2023-11-07 NOTE — TELEPHONE ENCOUNTER
Patient call back. Results and recommendations given. Patient verbalized understanding and willingness to comply. No further questions. 3 = unable to understand or speak (not related to language barrier)

## 2024-07-12 ENCOUNTER — HOSPITAL ENCOUNTER (EMERGENCY)
Age: 64
Discharge: HOME | End: 2024-07-12
Attending: EMERGENCY MEDICINE
Payer: COMMERCIAL

## 2024-07-12 ENCOUNTER — APPOINTMENT (OUTPATIENT)
Dept: CT IMAGING | Age: 64
End: 2024-07-12
Attending: EMERGENCY MEDICINE
Payer: COMMERCIAL

## 2024-07-12 VITALS
WEIGHT: 159 LBS | SYSTOLIC BLOOD PRESSURE: 136 MMHG | HEART RATE: 76 BPM | RESPIRATION RATE: 18 BRPM | DIASTOLIC BLOOD PRESSURE: 71 MMHG | OXYGEN SATURATION: 100 % | BODY MASS INDEX: 24 KG/M2 | TEMPERATURE: 99 F

## 2024-07-12 DIAGNOSIS — N20.0 KIDNEY STONE: Primary | ICD-10-CM

## 2024-07-12 LAB
ALBUMIN SERPL-MCNC: 4.1 G/DL (ref 3.4–5)
ALBUMIN/GLOB SERPL: 1.5 {RATIO} (ref 1–2)
ALP LIVER SERPL-CCNC: 77 U/L
ALT SERPL-CCNC: 41 U/L
ANION GAP SERPL CALC-SCNC: 9 MMOL/L (ref 0–18)
AST SERPL-CCNC: 24 U/L (ref 15–37)
BASOPHILS # BLD AUTO: 0.07 X10(3) UL (ref 0–0.2)
BASOPHILS NFR BLD AUTO: 0.6 %
BILIRUB SERPL-MCNC: 0.5 MG/DL (ref 0.1–2)
BILIRUB UR QL STRIP.AUTO: NEGATIVE
BUN BLD-MCNC: 9 MG/DL (ref 9–23)
CALCIUM BLD-MCNC: 9.6 MG/DL (ref 8.5–10.1)
CHLORIDE SERPL-SCNC: 104 MMOL/L (ref 98–112)
CO2 SERPL-SCNC: 23 MMOL/L (ref 21–32)
CREAT BLD-MCNC: 0.87 MG/DL
EGFRCR SERPLBLD CKD-EPI 2021: 75 ML/MIN/1.73M2 (ref 60–?)
EOSINOPHIL # BLD AUTO: 0.12 X10(3) UL (ref 0–0.7)
EOSINOPHIL NFR BLD AUTO: 1 %
ERYTHROCYTE [DISTWIDTH] IN BLOOD BY AUTOMATED COUNT: 12.7 %
GLOBULIN PLAS-MCNC: 2.8 G/DL (ref 2.8–4.4)
GLUCOSE BLD-MCNC: 269 MG/DL (ref 70–99)
GLUCOSE UR STRIP.AUTO-MCNC: 250 MG/DL
HCT VFR BLD AUTO: 44.4 %
HGB BLD-MCNC: 15.6 G/DL
IMM GRANULOCYTES # BLD AUTO: 0.04 X10(3) UL (ref 0–1)
IMM GRANULOCYTES NFR BLD: 0.3 %
KETONES UR STRIP.AUTO-MCNC: 15 MG/DL
LEUKOCYTE ESTERASE UR QL STRIP.AUTO: NEGATIVE
LYMPHOCYTES # BLD AUTO: 2.21 X10(3) UL (ref 1–4)
LYMPHOCYTES NFR BLD AUTO: 18.7 %
MCH RBC QN AUTO: 29.9 PG (ref 26–34)
MCHC RBC AUTO-ENTMCNC: 35.1 G/DL (ref 31–37)
MCV RBC AUTO: 85.1 FL
MONOCYTES # BLD AUTO: 0.46 X10(3) UL (ref 0.1–1)
MONOCYTES NFR BLD AUTO: 3.9 %
NEUTROPHILS # BLD AUTO: 8.94 X10 (3) UL (ref 1.5–7.7)
NEUTROPHILS # BLD AUTO: 8.94 X10(3) UL (ref 1.5–7.7)
NEUTROPHILS NFR BLD AUTO: 75.5 %
NITRITE UR QL STRIP.AUTO: NEGATIVE
OSMOLALITY SERPL CALC.SUM OF ELEC: 290 MOSM/KG (ref 275–295)
PH UR STRIP.AUTO: 5.5 [PH] (ref 5–8)
PLATELET # BLD AUTO: 253 10(3)UL (ref 150–450)
POTASSIUM SERPL-SCNC: 3.6 MMOL/L (ref 3.5–5.1)
PROT SERPL-MCNC: 6.9 G/DL (ref 6.4–8.2)
RBC # BLD AUTO: 5.22 X10(6)UL
RBC #/AREA URNS AUTO: >10 /HPF
SODIUM SERPL-SCNC: 136 MMOL/L (ref 136–145)
SP GR UR STRIP.AUTO: 1.02 (ref 1–1.03)
UROBILINOGEN UR STRIP.AUTO-MCNC: 0.2 MG/DL
WBC # BLD AUTO: 11.8 X10(3) UL (ref 4–11)
YEAST # UR AUTO: PRESENT /HPF
YEAST UR QL: PRESENT /HPF

## 2024-07-12 PROCEDURE — 81001 URINALYSIS AUTO W/SCOPE: CPT | Performed by: EMERGENCY MEDICINE

## 2024-07-12 PROCEDURE — 99284 EMERGENCY DEPT VISIT MOD MDM: CPT

## 2024-07-12 PROCEDURE — 80053 COMPREHEN METABOLIC PANEL: CPT | Performed by: EMERGENCY MEDICINE

## 2024-07-12 PROCEDURE — 74176 CT ABD & PELVIS W/O CONTRAST: CPT | Performed by: EMERGENCY MEDICINE

## 2024-07-12 PROCEDURE — 99285 EMERGENCY DEPT VISIT HI MDM: CPT

## 2024-07-12 PROCEDURE — 96361 HYDRATE IV INFUSION ADD-ON: CPT

## 2024-07-12 PROCEDURE — 96375 TX/PRO/DX INJ NEW DRUG ADDON: CPT

## 2024-07-12 PROCEDURE — 85025 COMPLETE CBC W/AUTO DIFF WBC: CPT | Performed by: EMERGENCY MEDICINE

## 2024-07-12 PROCEDURE — 96374 THER/PROPH/DIAG INJ IV PUSH: CPT

## 2024-07-12 PROCEDURE — S0119 ONDANSETRON 4 MG: HCPCS | Performed by: EMERGENCY MEDICINE

## 2024-07-12 PROCEDURE — 81015 MICROSCOPIC EXAM OF URINE: CPT | Performed by: EMERGENCY MEDICINE

## 2024-07-12 RX ORDER — KETOROLAC TROMETHAMINE 15 MG/ML
15 INJECTION, SOLUTION INTRAMUSCULAR; INTRAVENOUS ONCE
Status: COMPLETED | OUTPATIENT
Start: 2024-07-12 | End: 2024-07-12

## 2024-07-12 RX ORDER — ONDANSETRON 4 MG/1
4 TABLET, ORALLY DISINTEGRATING ORAL ONCE
Status: DISCONTINUED | OUTPATIENT
Start: 2024-07-12 | End: 2024-07-12

## 2024-07-12 RX ORDER — ONDANSETRON 2 MG/ML
4 INJECTION INTRAMUSCULAR; INTRAVENOUS ONCE
Status: COMPLETED | OUTPATIENT
Start: 2024-07-12 | End: 2024-07-12

## 2024-07-12 RX ORDER — TAMSULOSIN HYDROCHLORIDE 0.4 MG/1
0.4 CAPSULE ORAL DAILY
Qty: 7 CAPSULE | Refills: 0 | Status: SHIPPED | OUTPATIENT
Start: 2024-07-12 | End: 2024-07-19

## 2024-07-12 RX ORDER — HYDROCODONE BITARTRATE AND ACETAMINOPHEN 5; 325 MG/1; MG/1
1-2 TABLET ORAL EVERY 4 HOURS PRN
Qty: 12 TABLET | Refills: 0 | Status: SHIPPED | OUTPATIENT
Start: 2024-07-12 | End: 2024-07-15

## 2024-07-12 RX ORDER — SODIUM CHLORIDE 9 MG/ML
125 INJECTION, SOLUTION INTRAVENOUS CONTINUOUS
Status: DISCONTINUED | OUTPATIENT
Start: 2024-07-12 | End: 2024-07-12

## 2024-07-12 NOTE — ED PROVIDER NOTES
Patient Seen in: [facility] In [place]      History     Chief Complaint   Patient presents with    Abdomen/Flank Pain     Stated Complaint: right flank pain, nausea, vomiting    Subjective:   HPI    64-year-old female comes to the hospital stating at 3 in the morning she woke with pain in the area of her right flank rating to her right groin.  She had recently been diagnosed and finished a prescription of Macrobid for urinary tract infection.  She did have associated nausea and vomiting.  She rates pain a 7 out of 10.  She has had kidney stones in the past.  She has had an appendectomy and gallbladder removed in the past.  Nothing specific makes it better or worse.  She has any headaches or dizziness pressures no chest pain or shortness of breath.  She denies any dysuria.  She is denying any other specific complaints this time.    Objective:   Past Medical History:    Asthma (HCC)    Borderline high blood pressure    Diabetes (HCC)    oral controlled    Essential hypertension    Extrinsic asthma, unspecified    Pneumonia, organism unspecified(486)    Thyroid disease    Type II or unspecified type diabetes mellitus without mention of complication, not stated as uncontrolled    fbs-110    Visual impairment              No pertinent past surgical history.              No pertinent social history.            Review of Systems    Positive for stated Chief Complaint: Abdomen/Flank Pain    Other systems are as noted in HPI.  Constitutional and vital signs reviewed.      All other systems reviewed and negative except as noted above.    Physical Exam     ED Triage Vitals [07/12/24 0704]   BP (!) 179/87   Pulse 70   Resp 16   Temp 98.5 °F (36.9 °C)   Temp src Temporal   SpO2 98 %   O2 Device None (Room air)       Current Vitals:   Vital Signs  BP: 142/75  Pulse: 80  Resp: 16  Temp: 98.5 °F (36.9 °C)  Temp src: Temporal    Oxygen Therapy  SpO2: 98 %  O2 Device: None (Room air)            Physical  Exam    HEENT : NCAT, EOMI, PEERL,  neck supple, no JVD, trachea midline, No LAD  Heart: S1S2 normal. No murmurs, regular rate and rhythm  Lungs: Clear to auscultation bilaterally  Abdomen: Soft  right lower quadrant is mildly tender nondistended normal active bowel sounds without rebound, guarding or masses noted  Back nontender without CVA tenderness  Extremity no clubbing, cyanosis or edema noted.  Full range of motion noted without tenderness  Neuro: No focal deficits noted    All measures to prevent infection transmission during my interaction with the patient were taken.  The patient was already wearing droplet mask on my arrival to the room.  Personal protective equipment including a droplet mask as well as gloves were worn throughout the duration of my exam.  Hand washing was performed prior to and after the exam.  Stethoscope and equipment used during my examination was cleaned with a super Sani cloth germicidal wipe following the exam.    ED Course     Labs Reviewed   COMP METABOLIC PANEL (14) - Abnormal; Notable for the following components:       Result Value    Glucose 269 (*)     All other components within normal limits   URINALYSIS WITH CULTURE REFLEX - Abnormal; Notable for the following components:    Urine Color Brown (*)     Clarity Urine Cloudy (*)     Glucose Urine 250 (*)     Ketones Urine 15 (*)     Blood Urine Large (*)     Protein Urine 100 mg/dL (*)     All other components within normal limits   UA MICROSCOPIC ONLY, URINE - Abnormal; Notable for the following components:    RBC Urine >10 (*)     Bacteria Urine 2+ (*)     Squamous Epi. Cells Few (*)     Yeast Urine Present (*)     Budding Yeast Present (*)     All other components within normal limits   CBC W/ DIFFERENTIAL - Abnormal; Notable for the following components:    WBC 11.8 (*)     Neutrophil Absolute Prelim 8.94 (*)     Neutrophil Absolute 8.94 (*)     All other components within normal limits   CBC WITH DIFFERENTIAL WITH  PLATELET    Narrative:     The following orders were created for panel order CBC With Differential With Platelet.  Procedure                               Abnormality         Status                     ---------                               -----------         ------                     CBC W/ DIFFERENTIAL[[id number]]          Abnormal            Final result                 Please view results for these tests on the individual orders.   RAINBOW DRAW LAVENDER   RAINBOW DRAW LIGHT GREEN          ED Course as of 07/12/24 0910  ------------------------------------------------------------  Time: 07/12 0907  Comment: While here the patient had a urinalysis consistent with hematuria.  She had a glucose of 269.  She has a white count of 11.3 thousand.  She had a CT of the pelvis that I interpreted showing a proximal right ureteral stone.  I reviewed the radiology report as well.  The patient's time is pain-free with Toradol and is feeling better with Zofran as well.  Patient has had improvement in her blood pressure while here     CT ABDOMEN+PELVIS KIDNEYSTONE 2D RNDR(NO IV,NO ORAL)(CPT=74176)    Result Date: 7/12/2024  PROCEDURE:  CT ABDOMEN+PELVIS KIDNEYSTONE 2D RNDR(NO IV,NO ORAL)(CPT=74176)  COMPARISON:  [facility], CT, CT ABDOMEN+PELVIS KIDNEYSTONE 2D RNDR(NO IV,NO ORAL)(CPT=74176), 4/15/2022, 10:13 AM.  INDICATIONS:  right flank pain, nausea, vomiting  TECHNIQUE:  Unenhanced multislice CT scanning from above the kidneys to below the urinary bladder.  2D rendering are generated on the CT scanner workstation to localize potential stones in the cranio-caudal plane.  Dose reduction techniques were used. Dose information is transmitted to the ACR (American College of Radiology) NRDR (National Radiology Data Registry) which includes the Dose Index Registry.  PATIENT STATED HISTORY: (As transcribed by Technologist)  Patient has acute RLQ and right flank pain, Hx of kidney stones.    FINDINGS:  Please note the exam is  somewhat limited without intravenous or oral contrast.  KIDNEYS:  Mild right hydronephrosis is present.  This is likely due to a stone of the proximal right ureter measuring 6 mm.  Nonobstructing right renal stone measures up to 3 mm.  Probable there is a stable appearance of a possible cyst with milk of calcium present measuring up to 1.7 x 2.0 cm.  No specific further follow-up is suggested. BLADDER:  No mass, calculus or significant wall thickening. ADRENALS:  No mass or enlargement.  LIVER:  No enlargement, atrophy, abnormal density, or significant focal lesion.  BILIARY:  Postsurgical changes of cholecystectomy present. PANCREAS:  Pancreas appears atrophic which is stable.  SPLEEN:  No enlargement or focal lesion.  AORTA/VASCULAR:  No aneurysm.  RETROPERITONEUM:  No mass or adenopathy.  BOWEL/MESENTERY:  No dilated loops of small bowel are seen.  Portions of the bowel are decompressed, limiting evaluation.  Lack of oral contrast limits assessment.  No free fluid is seen. ABDOMINAL WALL:  Small umbilical hernia containing fat measures 1.2 x 1.7 cm. BONES:  No bony lesion or fracture. PELVIC ORGANS:  Unremarkable noncontrast appearance.  Please note that the uterus and ovaries are not well assessed with CT. LUNG BASES:  No visible pulmonary or pleural disease.  OTHER:  Negative.             CONCLUSION:  Mild right hydronephrosis is likely related to a stone of the proximal right ureter measuring up to 6 mm.  Additional nonobstructing right renal calculus.    LOCATION:  [id number]   Dictated by (CST): Maynor Sarmiento MD on 7/12/2024 at 8:24 AM     Finalized by (CST): Maynor Sarmiento MD on 7/12/2024 at 8:29 AM        Medications   sodium chloride 0.9% infusion (0 mL/hr Intravenous Stopped 7/12/24 0837)   ondansetron (Zofran-ODT) disintegrating tab 4 mg (has no administration in time range)   ketorolac (Toradol) 15 MG/ML injection 15 mg (15 mg Intravenous Given 7/12/24 0718)   ondansetron (Zofran) 4 MG/2ML injection 4 mg (4 mg  Intravenous Given 7/12/24 0718)              MDM      Differential diagnosis includes UTI, pyelonephritis, ureteral stone but not limited such.  Patient's workup here is consistent with a ureteral stone.  She is pain-free at this particular time we discharged home with medications for discomfort and follow-up with urology.    Patient was screened and evaluated during this visit.   As a treating physician attending to the patient, I determined, within reasonable clinical confidence and prior to discharge, that an emergency medical condition was not or was no longer present.  There was no indication for further evaluation, treatment or admission on an emergency basis.       The usual and customary discharge instuctions were discussed given the patient's ER course.  We discussed signs and symptoms that should prompt the patient's immediate return to the emergency department.   Reasonable over the counter and prescription treatment options and Physician follow up plan was discussed.       The patient is discharged in good condition.     This note was prepared using Dragon Medical voice recognition dictation software.  As a result errors may occur.  When identified to these areas have been corrected.  While every attempt is made to correct errors during dictation discrepancies may still exist.  Please contact if there are any errors.                                   Medical Decision Making      Disposition and Plan     Clinical Impression:  1. Kidney stone         Disposition:  Discharge  7/12/2024  9:10 am    Follow-up:  Sushma Schaefer  [address] [place] [id number] [phone]    Schedule an appointment as soon as possible for a visit in 2 day(s)      Yohan Mendiola MD  [address]  [address]  [facility] [id number] [phone]    Schedule an appointment as soon as possible for a visit in 2 day(s)            Medications Prescribed:  Current Discharge Medication List        START taking these medications     Details   HYDROcodone-acetaminophen 5-325 MG Oral Tab Take 1-2 tablets by mouth every 4 (four) hours as needed for Pain.  Qty: 12 tablet, Refills: 0    Associated Diagnoses: Kidney stone      tamsulosin (FLOMAX) 0.4 MG Oral Cap Take 1 capsule (0.4 mg total) by mouth daily for 7 days.  Qty: 7 capsule, Refills: 0    Associated Diagnoses: Kidney stone

## 2024-07-12 NOTE — ED INITIAL ASSESSMENT (HPI)
Pt states sudden onset of right lower abd pain radiates to right flank,   On ABX for a uti since synday

## 2024-07-13 ENCOUNTER — HOSPITAL ENCOUNTER (EMERGENCY)
Facility: HOSPITAL | Age: 64
Discharge: HOME | End: 2024-07-13
Attending: EMERGENCY MEDICINE
Payer: COMMERCIAL

## 2024-07-13 ENCOUNTER — TELEPHONE (OUTPATIENT)
Dept: CASE MANAGEMENT | Facility: HOSPITAL | Age: 64
End: 2024-07-13

## 2024-07-13 VITALS
DIASTOLIC BLOOD PRESSURE: 74 MMHG | OXYGEN SATURATION: 96 % | RESPIRATION RATE: 15 BRPM | SYSTOLIC BLOOD PRESSURE: 144 MMHG | HEART RATE: 75 BPM | TEMPERATURE: 98 F

## 2024-07-13 DIAGNOSIS — N20.0 KIDNEY STONE: Primary | ICD-10-CM

## 2024-07-13 LAB
ALBUMIN SERPL-MCNC: 3.8 G/DL (ref 3.4–5)
ALBUMIN/GLOB SERPL: 1.3 {RATIO} (ref 1–2)
ALP LIVER SERPL-CCNC: 73 U/L
ALT SERPL-CCNC: 38 U/L
ANION GAP SERPL CALC-SCNC: 10 MMOL/L (ref 0–18)
AST SERPL-CCNC: 22 U/L (ref 15–37)
BASOPHILS # BLD AUTO: 0.06 X10(3) UL (ref 0–0.2)
BASOPHILS NFR BLD AUTO: 0.4 %
BILIRUB SERPL-MCNC: 0.6 MG/DL (ref 0.1–2)
BILIRUB UR QL STRIP.AUTO: NEGATIVE
BUN BLD-MCNC: 12 MG/DL (ref 9–23)
CALCIUM BLD-MCNC: 8.8 MG/DL (ref 8.5–10.1)
CHLORIDE SERPL-SCNC: 105 MMOL/L (ref 98–112)
CLARITY UR REFRACT.AUTO: CLEAR
CO2 SERPL-SCNC: 23 MMOL/L (ref 21–32)
CREAT BLD-MCNC: 1.24 MG/DL
EGFRCR SERPLBLD CKD-EPI 2021: 49 ML/MIN/1.73M2 (ref 60–?)
EOSINOPHIL # BLD AUTO: 0.03 X10(3) UL (ref 0–0.7)
EOSINOPHIL NFR BLD AUTO: 0.2 %
ERYTHROCYTE [DISTWIDTH] IN BLOOD BY AUTOMATED COUNT: 12.9 %
GLOBULIN PLAS-MCNC: 2.9 G/DL (ref 2.8–4.4)
GLUCOSE BLD-MCNC: 214 MG/DL (ref 70–99)
GLUCOSE UR STRIP.AUTO-MCNC: 500 MG/DL
HCT VFR BLD AUTO: 44.8 %
HGB BLD-MCNC: 15.2 G/DL
IMM GRANULOCYTES # BLD AUTO: 0.06 X10(3) UL (ref 0–1)
IMM GRANULOCYTES NFR BLD: 0.4 %
KETONES UR STRIP.AUTO-MCNC: NEGATIVE MG/DL
LEUKOCYTE ESTERASE UR QL STRIP.AUTO: 250
LYMPHOCYTES # BLD AUTO: 2.58 X10(3) UL (ref 1–4)
LYMPHOCYTES NFR BLD AUTO: 17.2 %
MCH RBC QN AUTO: 29.9 PG (ref 26–34)
MCHC RBC AUTO-ENTMCNC: 33.9 G/DL (ref 31–37)
MCV RBC AUTO: 88.2 FL
MONOCYTES # BLD AUTO: 0.91 X10(3) UL (ref 0.1–1)
MONOCYTES NFR BLD AUTO: 6.1 %
NEUTROPHILS # BLD AUTO: 11.32 X10 (3) UL (ref 1.5–7.7)
NEUTROPHILS # BLD AUTO: 11.32 X10(3) UL (ref 1.5–7.7)
NEUTROPHILS NFR BLD AUTO: 75.7 %
NITRITE UR QL STRIP.AUTO: NEGATIVE
OSMOLALITY SERPL CALC.SUM OF ELEC: 292 MOSM/KG (ref 275–295)
PH UR STRIP.AUTO: 5.5 [PH] (ref 5–8)
PLATELET # BLD AUTO: 225 10(3)UL (ref 150–450)
POTASSIUM SERPL-SCNC: 4 MMOL/L (ref 3.5–5.1)
PROT SERPL-MCNC: 6.7 G/DL (ref 6.4–8.2)
PROT UR STRIP.AUTO-MCNC: NEGATIVE MG/DL
RBC # BLD AUTO: 5.08 X10(6)UL
RBC #/AREA URNS AUTO: >10 /HPF
SODIUM SERPL-SCNC: 138 MMOL/L (ref 136–145)
SP GR UR STRIP.AUTO: 1.01 (ref 1–1.03)
UROBILINOGEN UR STRIP.AUTO-MCNC: NORMAL MG/DL
WBC # BLD AUTO: 15 X10(3) UL (ref 4–11)

## 2024-07-13 PROCEDURE — 80053 COMPREHEN METABOLIC PANEL: CPT | Performed by: EMERGENCY MEDICINE

## 2024-07-13 PROCEDURE — 99285 EMERGENCY DEPT VISIT HI MDM: CPT

## 2024-07-13 PROCEDURE — 87086 URINE CULTURE/COLONY COUNT: CPT | Performed by: EMERGENCY MEDICINE

## 2024-07-13 PROCEDURE — 96361 HYDRATE IV INFUSION ADD-ON: CPT

## 2024-07-13 PROCEDURE — 81001 URINALYSIS AUTO W/SCOPE: CPT | Performed by: EMERGENCY MEDICINE

## 2024-07-13 PROCEDURE — 96375 TX/PRO/DX INJ NEW DRUG ADDON: CPT

## 2024-07-13 PROCEDURE — 99284 EMERGENCY DEPT VISIT MOD MDM: CPT

## 2024-07-13 PROCEDURE — 85025 COMPLETE CBC W/AUTO DIFF WBC: CPT | Performed by: EMERGENCY MEDICINE

## 2024-07-13 PROCEDURE — 96374 THER/PROPH/DIAG INJ IV PUSH: CPT

## 2024-07-13 RX ORDER — KETOROLAC TROMETHAMINE 15 MG/ML
15 INJECTION, SOLUTION INTRAMUSCULAR; INTRAVENOUS ONCE
Status: COMPLETED | OUTPATIENT
Start: 2024-07-13 | End: 2024-07-13

## 2024-07-13 RX ORDER — SODIUM CHLORIDE 9 MG/ML
125 INJECTION, SOLUTION INTRAVENOUS CONTINUOUS
Status: DISCONTINUED | OUTPATIENT
Start: 2024-07-13 | End: 2024-07-13

## 2024-07-13 RX ORDER — ONDANSETRON 2 MG/ML
4 INJECTION INTRAMUSCULAR; INTRAVENOUS ONCE
Status: DISCONTINUED | OUTPATIENT
Start: 2024-07-13 | End: 2024-07-13

## 2024-07-13 RX ORDER — HYDROMORPHONE HYDROCHLORIDE 1 MG/ML
0.5 INJECTION, SOLUTION INTRAMUSCULAR; INTRAVENOUS; SUBCUTANEOUS EVERY 30 MIN PRN
Status: DISCONTINUED | OUTPATIENT
Start: 2024-07-13 | End: 2024-07-13

## 2024-07-13 RX ORDER — HYDROCODONE BITARTRATE AND ACETAMINOPHEN 7.5; 325 MG/1; MG/1
1-2 TABLET ORAL EVERY 6 HOURS PRN
Qty: 20 TABLET | Refills: 0 | Status: SHIPPED | OUTPATIENT
Start: 2024-07-13 | End: 2024-07-13

## 2024-07-13 RX ORDER — HYDROCODONE BITARTRATE AND ACETAMINOPHEN 7.5; 325 MG/1; MG/1
1-2 TABLET ORAL EVERY 6 HOURS PRN
Qty: 20 TABLET | Refills: 0 | Status: SHIPPED | OUTPATIENT
Start: 2024-07-13 | End: 2024-07-16

## 2024-07-13 NOTE — ED PROVIDER NOTES
Patient Seen in: [facility] Emergency Department      History     Chief Complaint   Patient presents with    Pain    Abdomen/Flank Pain     Stated Complaint: has a kidney stone and needs more norcos    Subjective:   HPI    64-year-old female who was in the emergency room yesterday and diagnosed with a right-sided proximal 6 mm ureteral stone returns because of pain, nausea and vomiting today.  She has no dysuria.  She is no fevers or chills.  She had had nausea and did vomit.  She is denying any headaches or dizziness pressures no chest pain or troubles breathing.  She denying any other complaints at this time.    Objective:   Past Medical History:    Asthma (HCC)    Borderline high blood pressure    Diabetes (HCC)    oral controlled    Essential hypertension    Extrinsic asthma, unspecified    Pneumonia, organism unspecified(486)    Thyroid disease    Type II or unspecified type diabetes mellitus without mention of complication, not stated as uncontrolled    [id number]    Visual impairment              Past Surgical History:   Procedure Laterality Date    Appendectomy      Cholecystectomy      Colonoscopy  2013    Procedure: COLONOSCOPY;  Surgeon: Robert Casillas MD;  Location:  ENDOSCOPY    Lelo biopsy stereo nodule 1 site left (cpt=19081) Left 2021    benign    Lelo localization wire 1 site right (cpt=19281)            times 4    Other surgical history      Exc of Right Breast Mass                Social History     Socioeconomic History    Marital status:    Tobacco Use    Smoking status: Former     Current packs/day: 0.30     Average packs/day: 0.3 packs/day for 10.0 years (3.0 ttl pk-yrs)     Types: Cigarettes    Smokeless tobacco: Never   Vaping Use    Vaping status: Never Used   Substance and Sexual Activity    Alcohol use: No    Drug use: No    Sexual activity: Yes     Partners: Male     Comment: menopause   Other Topics Concern    Caffeine Concern Yes     Comment: 1-2 cup  off coffe daily     Exercise Yes     Comment: yoga    Seat Belt Yes              Review of Systems    Positive for stated Chief Complaint: Pain and Abdomen/Flank Pain    Other systems are as noted in HPI.  Constitutional and vital signs reviewed.      All other systems reviewed and negative except as noted above.    Physical Exam     ED Triage Vitals [07/13/24 1147]   BP (!) 189/108   Pulse 87   Resp 18   Temp 97.7 °F (36.5 °C)   Temp src Temporal   SpO2 99 %   O2 Device None (Room air)       Current Vitals:   Vital Signs  BP: 144/74  Pulse: 75  Resp: 15  Temp: 97.7 °F (36.5 °C)  Temp src: Temporal  MAP (mmHg): 93    Oxygen Therapy  SpO2: 96 %  O2 Device: None (Room air)            Physical Exam    HEENT : NCAT, EOMI, PEERL,  neck supple, no JVD, trachea midline, No LAD  Heart: S1S2 normal. No murmurs, regular rate and rhythm  Lungs: Clear to auscultation bilaterally  Abdomen: Soft nontender nondistended normal active bowel sounds without rebound, guarding or masses noted  Back nontender without CVA tenderness  Extremity no clubbing, cyanosis or edema noted.  Full range of motion noted without tenderness  Neuro: No focal deficits noted    All measures to prevent infection transmission during my interaction with the patient were taken.  The patient was already wearing droplet mask on my arrival to the room.  Personal protective equipment including a droplet mask as well as gloves were worn throughout the duration of my exam.  Hand washing was performed prior to and after the exam.  Stethoscope and equipment used during my examination was cleaned with a super Sani cloth germicidal wipe following the exam.    ED Course     Labs Reviewed   COMP METABOLIC PANEL (14) - Abnormal; Notable for the following components:       Result Value    Glucose 214 (*)     Creatinine 1.24 (*)     eGFR-Cr 49 (*)     All other components within normal limits   URINALYSIS WITH CULTURE REFLEX - Abnormal; Notable for the following components:     Glucose Urine 500 (*)     Blood Urine 2+ (*)     Leukocyte Esterase Urine 250 (*)     RBC Urine >10 (*)     Bacteria Urine Rare (*)     Squamous Epi. Cells Few (*)     All other components within normal limits   CBC W/ DIFFERENTIAL - Abnormal; Notable for the following components:    WBC 15.0 (*)     Neutrophil Absolute Prelim 11.32 (*)     Neutrophil Absolute 11.32 (*)     All other components within normal limits   CBC WITH DIFFERENTIAL WITH PLATELET    Narrative:     The following orders were created for panel order CBC With Differential With Platelet.  Procedure                               Abnormality         Status                     ---------                               -----------         ------                     CBC W/ DIFFERENTIAL[[id number]]          Abnormal            Final result                 Please view results for these tests on the individual orders.   URINE CULTURE, ROUTINE             Medications   HYDROmorphone (Dilaudid) 1 MG/ML injection 0.5 mg (0.5 mg Intravenous Given 7/13/24 1205)   ondansetron (Zofran) 4 MG/2ML injection 4 mg (has no administration in time range)   sodium chloride 0.9% infusion (125 mL/hr Intravenous New Bag 7/13/24 1205)   ketorolac (Toradol) 15 MG/ML injection 15 mg (15 mg Intravenous Given 7/13/24 1208)     While here we did check the patient's urine without sign of infection.  The patient's blood work was otherwise unremarkable.  I spoke with Dr. Knowles from urology and the patient will have prompt follow-up.  She is pain-free and would like to be discharged home.         MDM      Patient has a differential diagnosis including pyelonephritis, ureteral stone but not limited to such.  Patient had imaging yesterday showing a 6 mm right-sided ureteral stone.  She has no infection noted at this time.  She is pain-free and at this time she will be discharged home with outpatient management and care.  Spoke with urology and she will upon follow-up.      This note  was prepared using Dragon Medical voice recognition dictation software.  As a result errors may occur.  When identified to these areas have been corrected.  While every attempt is made to correct errors during dictation discrepancies may still exist.  Please contact if there are any errors.                                   Medical Decision Making      Disposition and Plan     Clinical Impression:  1. Kidney stone         Disposition:  Discharge  7/13/2024  1:00 pm    Follow-up:  Manas Alexis MD  [id number]  [place] [id number] [phone]    Schedule an appointment as soon as possible for a visit in 2 day(s)      Sushma Schaefer  [address] [facility] [id number] [phone]    Schedule an appointment as soon as possible for a visit in 2 day(s)            Medications Prescribed:  Current Discharge Medication List        START taking these medications    Details   HYDROcodone-acetaminophen 7.5-325 MG Oral Tab Take 1-2 tablets by mouth every 6 (six) hours as needed for Pain.  Qty: 20 tablet, Refills: 0    Associated Diagnoses: Kidney stone

## 2024-07-13 NOTE — ED INITIAL ASSESSMENT (HPI)
Pt presents to ER with persistent pain from a kidney stone that was dx yesterday. Pt states she had to take multiple norcos she was prescribed due to nausea. Last Norco was 1015. Last Zofran was 1025.

## 2024-07-13 NOTE — CM/SW NOTE
Pt s/o called stating that the pharmacy that the norco was sent to is out of the rx and requesting it be sent to [facility] at [address] in [place]. CM notified Dr. Dubois.    @[phone] CM confirmed that [facility] received the electronic RX, per pharmacy, pt just picked the RX up.

## 2024-07-15 ENCOUNTER — OFFICE VISIT (OUTPATIENT)
Dept: SURGERY | Facility: CLINIC | Age: 64
End: 2024-07-15

## 2024-07-15 ENCOUNTER — LAB ENCOUNTER (OUTPATIENT)
Dept: LAB | Facility: HOSPITAL | Age: 64
End: 2024-07-15
Attending: UROLOGY
Payer: COMMERCIAL

## 2024-07-15 ENCOUNTER — HOSPITAL ENCOUNTER (OUTPATIENT)
Dept: GENERAL RADIOLOGY | Facility: HOSPITAL | Age: 64
Discharge: HOME | End: 2024-07-15
Attending: UROLOGY
Payer: COMMERCIAL

## 2024-07-15 ENCOUNTER — TELEPHONE (OUTPATIENT)
Dept: SURGERY | Facility: CLINIC | Age: 64
End: 2024-07-15

## 2024-07-15 VITALS
DIASTOLIC BLOOD PRESSURE: 90 MMHG | SYSTOLIC BLOOD PRESSURE: 160 MMHG | HEIGHT: 68 IN | WEIGHT: 159 LBS | BODY MASS INDEX: 24.1 KG/M2 | HEART RATE: 82 BPM

## 2024-07-15 DIAGNOSIS — Z79.01 MONITORING FOR ANTICOAGULANT USE: ICD-10-CM

## 2024-07-15 DIAGNOSIS — Z51.81 MONITORING FOR ANTICOAGULANT USE: ICD-10-CM

## 2024-07-15 DIAGNOSIS — N23 RENAL COLIC ON RIGHT SIDE: ICD-10-CM

## 2024-07-15 DIAGNOSIS — Z01.818 PREOP EXAMINATION: ICD-10-CM

## 2024-07-15 DIAGNOSIS — N20.1 RIGHT URETERAL STONE: Primary | ICD-10-CM

## 2024-07-15 DIAGNOSIS — N13.2 URETERAL STONE WITH HYDRONEPHROSIS: ICD-10-CM

## 2024-07-15 DIAGNOSIS — N20.1 RIGHT URETERAL STONE: ICD-10-CM

## 2024-07-15 LAB
APTT PPP: 27.9 SECONDS (ref 23–36)
INR BLD: 0.93 (ref 0.8–1.2)
PROTHROMBIN TIME: 13 SECONDS (ref 11.6–14.8)

## 2024-07-15 PROCEDURE — 85610 PROTHROMBIN TIME: CPT

## 2024-07-15 PROCEDURE — 74018 RADEX ABDOMEN 1 VIEW: CPT | Performed by: UROLOGY

## 2024-07-15 PROCEDURE — 93005 ELECTROCARDIOGRAM TRACING: CPT

## 2024-07-15 PROCEDURE — 93010 ELECTROCARDIOGRAM REPORT: CPT | Performed by: STUDENT IN AN ORGANIZED HEALTH CARE EDUCATION/TRAINING PROGRAM

## 2024-07-15 PROCEDURE — 85730 THROMBOPLASTIN TIME PARTIAL: CPT

## 2024-07-15 PROCEDURE — 36415 COLL VENOUS BLD VENIPUNCTURE: CPT

## 2024-07-15 RX ORDER — ONDANSETRON 4 MG/1
4 TABLET, ORALLY DISINTEGRATING ORAL EVERY 8 HOURS PRN
COMMUNITY

## 2024-07-15 RX ORDER — TIRZEPATIDE 5 MG/.5ML
5 INJECTION, SOLUTION SUBCUTANEOUS WEEKLY
COMMUNITY
Start: 2024-06-20

## 2024-07-15 NOTE — TELEPHONE ENCOUNTER
Patient needing to be seen today after visit over the weekend in ED. Per patient ED doctor did reach out to Dr. Alexis in regards to patient being seen today. Please advise

## 2024-07-15 NOTE — TELEPHONE ENCOUNTER
I called pt verified name/ offered rafaela today 7/15/24 at 11:30am location/address given insur confirmed appointment booked.

## 2024-07-15 NOTE — H&P
[facility]  Initial Office Consultation    HPI:   Angeli Smith is a 64 year old female here today for consultation at the request of, and a copy of this note will be sent to, CECY DING.      1. Right Ureteral Stone with hydronephrosis  Patient with previous history of nephrolithiasis a few years ago where she passed the kidney stone.    She presented to the ER on 7/12/2024 with sudden onset severe, colicky right-sided flank pain that radiates to the loin.  It was associated with nausea and vomiting as well as gross hematuria.  She reported some chills but no fevers.    She was afebrile and hemodynamically stable in the ER.  CT abdomen pelvis without contrast revealed a 6 mm right proximal ureteral stone with upstream hydronephrosis.  Nonobstructing right kidney stone up to 3 mm.    Blood work revealed mild leukocytosis.  Normal BUN and creatinine.  Urinalysis with large blood but no clear signs of UTI.  She was discharged home on medical expulsive therapy.    She returned to the ER on 7/13/2024 with persistent pain, nausea, and vomiting.  Blood work revealed mild FARTUN with a creatinine of 1.24.  WBC count was 15 K without left shift.  Urinalysis with 2+ leuks, no nitrites, rare bacteria, >10 RBCs, 1-5 WBCs.    Urine culture negative.    Her pain was again controlled and she was discharged home with instructions for close follow-up.    She reports taking Norco pretty much around-the-clock.  She reports intermittent pain.  She has been straining her urine and has not noticed passing the stone.  No fevers.  Her hematuria has cleared.      PAST MEDICAL HISTORY: Diabetes.  HTN.  HLD.  Hypothyroidism.  Nephrolithiasis.  Asthma.    PAST SURGICAL HISTORY: Cholecystectomy.  Appendectomy.    SOCIAL HISTORY: .  She has 4 children.  She is a social smoker.  No alcohol.  Retired .     Family History   Problem Relation Age of Onset    Diabetes Father     Diabetes  Mother      Allergies: Dextromethorphan, Iodine (topical), Penicillin g, Iodine solution [povidone iodine], Penicillins, Radiology contrast iodinated dyes, Shellfish-derived products, and Suphedrin [pseudoephedrine]      REVIEW OF SYSTEMS:  Pertinent positives and negatives per HPI. A 12-point ROS was performed and is otherwise negative.       EXAM:  /90 (BP Location: Left arm, Patient Position: Sitting, Cuff Size: adult)   Pulse 82   Ht 5' 8\" (1.727 m)   Wt 159 lb (72.1 kg)   BMI 24.18 kg/m²     Physical Exam  Constitutional:       General: She is not in acute distress.     Appearance: She is well-developed.   HENT:      Head: Normocephalic.   Eyes:      General: No scleral icterus.  Cardiovascular:      Rate and Rhythm: Normal rate.   Pulmonary:      Effort: Pulmonary effort is normal.   Abdominal:      General: There is no distension.   Musculoskeletal:         General: Normal range of motion.   Skin:     General: Skin is warm and dry.   Neurological:      Mental Status: She is alert and oriented to person, place, and time.   Psychiatric:         Mood and Affect: Mood normal.         Behavior: Behavior normal.       LABS:  See HPI for details.      IMAGING:  CT ABDOMEN+PELVIS KIDNEYSTONE (7/12/2024) which I personally reviewed.  To my interpretation there is a 6 mm stone in the right proximal to mid ureter at the L3 level.  Mild upstream hydronephrosis.  Final radiology report revealing:    KIDNEYS:  Mild right hydronephrosis is present.  This is likely due to a stone of the proximal right ureter measuring 6 mm.  Nonobstructing right renal stone measures up to 3 mm.  Probable there is a stable appearance of a possible cyst with milk of calcium present measuring up to 1.7 x 2.0 cm.  No specific further follow-up is suggested.     CONCLUSION:  Mild right hydronephrosis is likely related to a stone of the proximal right ureter measuring up to 6 mm.  Additional nonobstructing right renal calculus.             Patient went down for KUB film today 7/15/2024.  I reviewed the results with the interpreting radiologist and agree with final radiology report that states: No definitive calculus is identified in the proximal right ureter.  Linear 9 mm region of calcification overlying the peripheral aspect of the right kidney, which likely reflects mineralization associated with the previously demonstrated cystic lesion on the prior CT abdomen and pelvis dated 07/12/2024.  Lesser incidental findings described above.           IMPRESSION:  64 year old female with 2 visits to the ER related to a 6 mm right proximal ureteral stone, initially diagnosed 7/12/2024.    She has not passed the stone yet.  She is taking Norco as prescribed by the ER and has been straining her urine.  She has not passed the stone yet.  She has been taking tamsulosin.    Different stone management options were discussed including continued trial of medical expulsive therapy, or ureteroscopy with laser lithotripsy.  Given that her stone cannot be seen on KUB film, I do not think that she is a candidate for extracorporeal shockwave lithotripsy.     Rationale and approach as well as benefits, risks, possible complications and reasonable alternatives of each treatment option were discussed with the patient.  Stone clearance rates were discussed as well as the expected postoperative course of recovery.     If inserted, we discussed the eventual need for stent removal which is usually performed in the office setting.  We discussed expected stent related discomfort.    We discussed risks of surgery including but not limited to medical and anesthetic complications, bleeding, infection, damage to surrounding organs or structures, ureteral injury, stricture formation, and possible need for additional procedures.     Patient verbalized understanding. All questions were answered.    She elects to proceed with cystoscopy, right ureteroscopy, laser lithotripsy,  stone removal, and stent insertion.      PLAN:  1.  Schedule for cystoscopy, right retrograde pyelography, right ureteroscopy, laser lithotripsy, stone removal, and stent insertion.    Routine preop testing.    Manas Alexis MD  7/15/2024

## 2024-07-15 NOTE — TELEPHONE ENCOUNTER
After talking to Dr. GARCIA I called pt back verified name/ informed pt Dr. GARCIA will be in the O.R. at 11:30am so I moved pt to 4:20pm today pt accepts appointment booked and call ended.

## 2024-07-15 NOTE — H&P (VIEW-ONLY)
[facility]  Initial Office Consultation    HPI:   Angeli Smith is a 64 year old female here today for consultation at the request of, and a copy of this note will be sent to, CECY DING.      1. Right Ureteral Stone with hydronephrosis  Patient with previous history of nephrolithiasis a few years ago where she passed the kidney stone.    She presented to the ER on 7/12/2024 with sudden onset severe, colicky right-sided flank pain that radiates to the loin.  It was associated with nausea and vomiting as well as gross hematuria.  She reported some chills but no fevers.    She was afebrile and hemodynamically stable in the ER.  CT abdomen pelvis without contrast revealed a 6 mm right proximal ureteral stone with upstream hydronephrosis.  Nonobstructing right kidney stone up to 3 mm.    Blood work revealed mild leukocytosis.  Normal BUN and creatinine.  Urinalysis with large blood but no clear signs of UTI.  She was discharged home on medical expulsive therapy.    She returned to the ER on 7/13/2024 with persistent pain, nausea, and vomiting.  Blood work revealed mild FARTUN with a creatinine of 1.24.  WBC count was 15 K without left shift.  Urinalysis with 2+ leuks, no nitrites, rare bacteria, >10 RBCs, 1-5 WBCs.    Urine culture negative.    Her pain was again controlled and she was discharged home with instructions for close follow-up.    She reports taking Norco pretty much around-the-clock.  She reports intermittent pain.  She has been straining her urine and has not noticed passing the stone.  No fevers.  Her hematuria has cleared.      PAST MEDICAL HISTORY: Diabetes.  HTN.  HLD.  Hypothyroidism.  Nephrolithiasis.  Asthma.    PAST SURGICAL HISTORY: Cholecystectomy.  Appendectomy.    SOCIAL HISTORY: .  She has 4 children.  She is a social smoker.  No alcohol.  Retired .     Family History   Problem Relation Age of Onset    Diabetes Father     Diabetes  Mother      Allergies: Dextromethorphan, Iodine (topical), Penicillin g, Iodine solution [povidone iodine], Penicillins, Radiology contrast iodinated dyes, Shellfish-derived products, and Suphedrin [pseudoephedrine]      REVIEW OF SYSTEMS:  Pertinent positives and negatives per HPI. A 12-point ROS was performed and is otherwise negative.       EXAM:  /90 (BP Location: Left arm, Patient Position: Sitting, Cuff Size: adult)   Pulse 82   Ht 5' 8\" (1.727 m)   Wt 159 lb (72.1 kg)   BMI 24.18 kg/m²     Physical Exam  Constitutional:       General: She is not in acute distress.     Appearance: She is well-developed.   HENT:      Head: Normocephalic.   Eyes:      General: No scleral icterus.  Cardiovascular:      Rate and Rhythm: Normal rate.   Pulmonary:      Effort: Pulmonary effort is normal.   Abdominal:      General: There is no distension.   Musculoskeletal:         General: Normal range of motion.   Skin:     General: Skin is warm and dry.   Neurological:      Mental Status: She is alert and oriented to person, place, and time.   Psychiatric:         Mood and Affect: Mood normal.         Behavior: Behavior normal.       LABS:  See HPI for details.      IMAGING:  CT ABDOMEN+PELVIS KIDNEYSTONE (7/12/2024) which I personally reviewed.  To my interpretation there is a 6 mm stone in the right proximal to mid ureter at the L3 level.  Mild upstream hydronephrosis.  Final radiology report revealing:    KIDNEYS:  Mild right hydronephrosis is present.  This is likely due to a stone of the proximal right ureter measuring 6 mm.  Nonobstructing right renal stone measures up to 3 mm.  Probable there is a stable appearance of a possible cyst with milk of calcium present measuring up to 1.7 x 2.0 cm.  No specific further follow-up is suggested.     CONCLUSION:  Mild right hydronephrosis is likely related to a stone of the proximal right ureter measuring up to 6 mm.  Additional nonobstructing right renal calculus.             Patient went down for KUB film today 7/15/2024.  I reviewed the results with the interpreting radiologist and agree with final radiology report that states: No definitive calculus is identified in the proximal right ureter.  Linear 9 mm region of calcification overlying the peripheral aspect of the right kidney, which likely reflects mineralization associated with the previously demonstrated cystic lesion on the prior CT abdomen and pelvis dated 07/12/2024.  Lesser incidental findings described above.           IMPRESSION:  64 year old female with 2 visits to the ER related to a 6 mm right proximal ureteral stone, initially diagnosed 7/12/2024.    She has not passed the stone yet.  She is taking Norco as prescribed by the ER and has been straining her urine.  She has not passed the stone yet.  She has been taking tamsulosin.    Different stone management options were discussed including continued trial of medical expulsive therapy, or ureteroscopy with laser lithotripsy.  Given that her stone cannot be seen on KUB film, I do not think that she is a candidate for extracorporeal shockwave lithotripsy.     Rationale and approach as well as benefits, risks, possible complications and reasonable alternatives of each treatment option were discussed with the patient.  Stone clearance rates were discussed as well as the expected postoperative course of recovery.     If inserted, we discussed the eventual need for stent removal which is usually performed in the office setting.  We discussed expected stent related discomfort.    We discussed risks of surgery including but not limited to medical and anesthetic complications, bleeding, infection, damage to surrounding organs or structures, ureteral injury, stricture formation, and possible need for additional procedures.     Patient verbalized understanding. All questions were answered.    She elects to proceed with cystoscopy, right ureteroscopy, laser lithotripsy,  stone removal, and stent insertion.      PLAN:  1.  Schedule for cystoscopy, right retrograde pyelography, right ureteroscopy, laser lithotripsy, stone removal, and stent insertion.    Routine preop testing.    Manas Alexis MD  7/15/2024

## 2024-07-16 ENCOUNTER — TELEPHONE (OUTPATIENT)
Dept: SURGERY | Facility: CLINIC | Age: 64
End: 2024-07-16

## 2024-07-16 ENCOUNTER — ANESTHESIA EVENT (OUTPATIENT)
Dept: SURGERY | Facility: HOSPITAL | Age: 64
End: 2024-07-16
Payer: COMMERCIAL

## 2024-07-16 ENCOUNTER — HOSPITAL ENCOUNTER (OUTPATIENT)
Facility: HOSPITAL | Age: 64
Setting detail: HOSPITAL OUTPATIENT SURGERY
Discharge: HOME | End: 2024-07-16
Attending: UROLOGY | Admitting: UROLOGY
Payer: COMMERCIAL

## 2024-07-16 ENCOUNTER — ANESTHESIA (OUTPATIENT)
Dept: SURGERY | Facility: HOSPITAL | Age: 64
End: 2024-07-16
Payer: COMMERCIAL

## 2024-07-16 ENCOUNTER — APPOINTMENT (OUTPATIENT)
Dept: GENERAL RADIOLOGY | Facility: HOSPITAL | Age: 64
End: 2024-07-16
Attending: UROLOGY
Payer: COMMERCIAL

## 2024-07-16 VITALS
RESPIRATION RATE: 16 BRPM | SYSTOLIC BLOOD PRESSURE: 145 MMHG | DIASTOLIC BLOOD PRESSURE: 75 MMHG | HEIGHT: 68 IN | WEIGHT: 159 LBS | BODY MASS INDEX: 24.1 KG/M2 | OXYGEN SATURATION: 96 % | HEART RATE: 86 BPM | TEMPERATURE: 98 F

## 2024-07-16 DIAGNOSIS — N20.1 RIGHT URETERAL STONE: ICD-10-CM

## 2024-07-16 DIAGNOSIS — N20.1 RIGHT URETERAL STONE: Primary | ICD-10-CM

## 2024-07-16 LAB
ATRIAL RATE: 74 BPM
GLUCOSE BLDC GLUCOMTR-MCNC: 124 MG/DL (ref 70–99)
GLUCOSE BLDC GLUCOMTR-MCNC: 149 MG/DL (ref 70–99)
P AXIS: 51 DEGREES
P-R INTERVAL: 158 MS
Q-T INTERVAL: 382 MS
QRS DURATION: 90 MS
QTC CALCULATION (BEZET): 424 MS
R AXIS: 15 DEGREES
T AXIS: 19 DEGREES
VENTRICULAR RATE: 74 BPM

## 2024-07-16 PROCEDURE — 74420 UROGRAPHY RTRGR +-KUB: CPT | Performed by: UROLOGY

## 2024-07-16 PROCEDURE — 0TC68ZZ EXTIRPATION OF MATTER FROM RIGHT URETER, VIA NATURAL OR ARTIFICIAL OPENING ENDOSCOPIC: ICD-10-PCS | Performed by: UROLOGY

## 2024-07-16 PROCEDURE — 0T768DZ DILATION OF RIGHT URETER WITH INTRALUMINAL DEVICE, VIA NATURAL OR ARTIFICIAL OPENING ENDOSCOPIC: ICD-10-PCS | Performed by: UROLOGY

## 2024-07-16 PROCEDURE — BT1D1ZZ FLUOROSCOPY OF RIGHT KIDNEY, URETER AND BLADDER USING LOW OSMOLAR CONTRAST: ICD-10-PCS | Performed by: UROLOGY

## 2024-07-16 PROCEDURE — 52356 CYSTO/URETERO W/LITHOTRIPSY: CPT | Performed by: UROLOGY

## 2024-07-16 DEVICE — URETERAL STENT
Type: IMPLANTABLE DEVICE | Site: URETER | Status: FUNCTIONAL
Brand: ASCERTA™

## 2024-07-16 RX ORDER — HYDROMORPHONE HYDROCHLORIDE 1 MG/ML
0.4 INJECTION, SOLUTION INTRAMUSCULAR; INTRAVENOUS; SUBCUTANEOUS EVERY 5 MIN PRN
Status: DISCONTINUED | OUTPATIENT
Start: 2024-07-16 | End: 2024-07-16

## 2024-07-16 RX ORDER — DEXTROSE MONOHYDRATE 25 G/50ML
50 INJECTION, SOLUTION INTRAVENOUS
Status: DISCONTINUED | OUTPATIENT
Start: 2024-07-16 | End: 2024-07-16

## 2024-07-16 RX ORDER — HYDROMORPHONE HYDROCHLORIDE 1 MG/ML
0.2 INJECTION, SOLUTION INTRAMUSCULAR; INTRAVENOUS; SUBCUTANEOUS EVERY 5 MIN PRN
Status: DISCONTINUED | OUTPATIENT
Start: 2024-07-16 | End: 2024-07-16

## 2024-07-16 RX ORDER — HYDROMORPHONE HYDROCHLORIDE 1 MG/ML
0.6 INJECTION, SOLUTION INTRAMUSCULAR; INTRAVENOUS; SUBCUTANEOUS EVERY 5 MIN PRN
Status: DISCONTINUED | OUTPATIENT
Start: 2024-07-16 | End: 2024-07-16

## 2024-07-16 RX ORDER — EPHEDRINE SULFATE 50 MG/ML
INJECTION, SOLUTION INTRAVENOUS AS NEEDED
Status: DISCONTINUED | OUTPATIENT
Start: 2024-07-16 | End: 2024-07-16 | Stop reason: SURG

## 2024-07-16 RX ORDER — PHENAZOPYRIDINE HYDROCHLORIDE 200 MG/1
200 TABLET, FILM COATED ORAL ONCE AS NEEDED
Status: COMPLETED | OUTPATIENT
Start: 2024-07-16 | End: 2024-07-16

## 2024-07-16 RX ORDER — MORPHINE SULFATE 4 MG/ML
2 INJECTION, SOLUTION INTRAMUSCULAR; INTRAVENOUS EVERY 10 MIN PRN
Status: DISCONTINUED | OUTPATIENT
Start: 2024-07-16 | End: 2024-07-16

## 2024-07-16 RX ORDER — SULFAMETHOXAZOLE AND TRIMETHOPRIM 800; 160 MG/1; MG/1
1 TABLET ORAL 2 TIMES DAILY
Qty: 10 TABLET | Refills: 0 | Status: SHIPPED | OUTPATIENT
Start: 2024-07-16 | End: 2024-07-21

## 2024-07-16 RX ORDER — SODIUM CHLORIDE, SODIUM LACTATE, POTASSIUM CHLORIDE, CALCIUM CHLORIDE 600; 310; 30; 20 MG/100ML; MG/100ML; MG/100ML; MG/100ML
INJECTION, SOLUTION INTRAVENOUS CONTINUOUS
Status: DISCONTINUED | OUTPATIENT
Start: 2024-07-16 | End: 2024-07-16

## 2024-07-16 RX ORDER — HYDROMORPHONE HYDROCHLORIDE 1 MG/ML
0.8 INJECTION, SOLUTION INTRAMUSCULAR; INTRAVENOUS; SUBCUTANEOUS ONCE
Status: COMPLETED | OUTPATIENT
Start: 2024-07-16 | End: 2024-07-16

## 2024-07-16 RX ORDER — PHENAZOPYRIDINE HYDROCHLORIDE 100 MG/1
100 TABLET, FILM COATED ORAL 3 TIMES DAILY PRN
Qty: 9 TABLET | Refills: 0 | Status: SHIPPED | OUTPATIENT
Start: 2024-07-16

## 2024-07-16 RX ORDER — PHENYLEPHRINE HCL 10 MG/ML
VIAL (ML) INJECTION AS NEEDED
Status: DISCONTINUED | OUTPATIENT
Start: 2024-07-16 | End: 2024-07-16 | Stop reason: SURG

## 2024-07-16 RX ORDER — DEXAMETHASONE SODIUM PHOSPHATE 4 MG/ML
VIAL (ML) INJECTION AS NEEDED
Status: DISCONTINUED | OUTPATIENT
Start: 2024-07-16 | End: 2024-07-16 | Stop reason: SURG

## 2024-07-16 RX ORDER — NICOTINE POLACRILEX 4 MG
30 LOZENGE BUCCAL
Status: DISCONTINUED | OUTPATIENT
Start: 2024-07-16 | End: 2024-07-16

## 2024-07-16 RX ORDER — ONDANSETRON 2 MG/ML
INJECTION INTRAMUSCULAR; INTRAVENOUS AS NEEDED
Status: DISCONTINUED | OUTPATIENT
Start: 2024-07-16 | End: 2024-07-16 | Stop reason: SURG

## 2024-07-16 RX ORDER — LIDOCAINE HYDROCHLORIDE 10 MG/ML
INJECTION, SOLUTION EPIDURAL; INFILTRATION; INTRACAUDAL; PERINEURAL AS NEEDED
Status: DISCONTINUED | OUTPATIENT
Start: 2024-07-16 | End: 2024-07-16 | Stop reason: SURG

## 2024-07-16 RX ORDER — MORPHINE SULFATE 4 MG/ML
4 INJECTION, SOLUTION INTRAMUSCULAR; INTRAVENOUS EVERY 10 MIN PRN
Status: DISCONTINUED | OUTPATIENT
Start: 2024-07-16 | End: 2024-07-16

## 2024-07-16 RX ORDER — SODIUM CHLORIDE, SODIUM LACTATE, POTASSIUM CHLORIDE, CALCIUM CHLORIDE 600; 310; 30; 20 MG/100ML; MG/100ML; MG/100ML; MG/100ML
INJECTION, SOLUTION INTRAVENOUS CONTINUOUS PRN
Status: DISCONTINUED | OUTPATIENT
Start: 2024-07-16 | End: 2024-07-16 | Stop reason: SURG

## 2024-07-16 RX ORDER — ROCURONIUM BROMIDE 10 MG/ML
INJECTION, SOLUTION INTRAVENOUS AS NEEDED
Status: DISCONTINUED | OUTPATIENT
Start: 2024-07-16 | End: 2024-07-16 | Stop reason: SURG

## 2024-07-16 RX ORDER — MIDAZOLAM HYDROCHLORIDE 1 MG/ML
INJECTION INTRAMUSCULAR; INTRAVENOUS AS NEEDED
Status: DISCONTINUED | OUTPATIENT
Start: 2024-07-16 | End: 2024-07-16 | Stop reason: SURG

## 2024-07-16 RX ORDER — SODIUM CHLORIDE 9 MG/ML
INJECTION, SOLUTION INTRAVENOUS CONTINUOUS
Status: DISCONTINUED | OUTPATIENT
Start: 2024-07-16 | End: 2024-07-16

## 2024-07-16 RX ORDER — NALOXONE HYDROCHLORIDE 0.4 MG/ML
0.08 INJECTION, SOLUTION INTRAMUSCULAR; INTRAVENOUS; SUBCUTANEOUS AS NEEDED
Status: DISCONTINUED | OUTPATIENT
Start: 2024-07-16 | End: 2024-07-16

## 2024-07-16 RX ORDER — MORPHINE SULFATE 10 MG/ML
6 INJECTION, SOLUTION INTRAMUSCULAR; INTRAVENOUS EVERY 10 MIN PRN
Status: DISCONTINUED | OUTPATIENT
Start: 2024-07-16 | End: 2024-07-16

## 2024-07-16 RX ORDER — IOPAMIDOL 612 MG/ML
INJECTION, SOLUTION INTRATHECAL AS NEEDED
Status: DISCONTINUED | OUTPATIENT
Start: 2024-07-16 | End: 2024-07-16 | Stop reason: HOSPADM

## 2024-07-16 RX ORDER — HYDROMORPHONE HYDROCHLORIDE 1 MG/ML
0.2 INJECTION, SOLUTION INTRAMUSCULAR; INTRAVENOUS; SUBCUTANEOUS ONCE
Status: COMPLETED | OUTPATIENT
Start: 2024-07-16 | End: 2024-07-16

## 2024-07-16 RX ORDER — LIDOCAINE HYDROCHLORIDE 20 MG/ML
JELLY TOPICAL AS NEEDED
Status: DISCONTINUED | OUTPATIENT
Start: 2024-07-16 | End: 2024-07-16 | Stop reason: HOSPADM

## 2024-07-16 RX ORDER — NICOTINE POLACRILEX 4 MG
15 LOZENGE BUCCAL
Status: DISCONTINUED | OUTPATIENT
Start: 2024-07-16 | End: 2024-07-16

## 2024-07-16 RX ORDER — HYDROMORPHONE HYDROCHLORIDE 1 MG/ML
0.4 INJECTION, SOLUTION INTRAMUSCULAR; INTRAVENOUS; SUBCUTANEOUS ONCE
Status: COMPLETED | OUTPATIENT
Start: 2024-07-16 | End: 2024-07-16

## 2024-07-16 RX ORDER — ACETAMINOPHEN 500 MG
1000 TABLET ORAL ONCE
Status: DISCONTINUED | OUTPATIENT
Start: 2024-07-16 | End: 2024-07-16 | Stop reason: HOSPADM

## 2024-07-16 RX ADMIN — DEXAMETHASONE SODIUM PHOSPHATE 4 MG: 4 MG/ML VIAL (ML) INJECTION at 16:10:00

## 2024-07-16 RX ADMIN — ONDANSETRON 4 MG: 2 INJECTION INTRAMUSCULAR; INTRAVENOUS at 16:14:00

## 2024-07-16 RX ADMIN — SODIUM CHLORIDE, SODIUM LACTATE, POTASSIUM CHLORIDE, CALCIUM CHLORIDE: 600; 310; 30; 20 INJECTION, SOLUTION INTRAVENOUS at 16:41:00

## 2024-07-16 RX ADMIN — SODIUM CHLORIDE, SODIUM LACTATE, POTASSIUM CHLORIDE, CALCIUM CHLORIDE: 600; 310; 30; 20 INJECTION, SOLUTION INTRAVENOUS at 15:27:00

## 2024-07-16 RX ADMIN — MIDAZOLAM HYDROCHLORIDE 2 MG: 1 INJECTION INTRAMUSCULAR; INTRAVENOUS at 15:32:00

## 2024-07-16 RX ADMIN — PHENYLEPHRINE HCL 100 MCG: 10 MG/ML VIAL (ML) INJECTION at 16:08:00

## 2024-07-16 RX ADMIN — LIDOCAINE HYDROCHLORIDE 50 MG: 10 INJECTION, SOLUTION EPIDURAL; INFILTRATION; INTRACAUDAL; PERINEURAL at 15:32:00

## 2024-07-16 RX ADMIN — ROCURONIUM BROMIDE 10 MG: 10 INJECTION, SOLUTION INTRAVENOUS at 15:32:00

## 2024-07-16 RX ADMIN — EPHEDRINE SULFATE 10 MG: 50 INJECTION, SOLUTION INTRAVENOUS at 15:54:00

## 2024-07-16 NOTE — INTERVAL H&P NOTE
Pre-op Diagnosis: Right ureteral stone [N20.1]    The above referenced H&P was reviewed by Manas Alexis MD on 7/16/2024, the patient was examined and no significant changes have occurred in the patient's condition since the H&P was performed.  I discussed with the patient and/or legal representative the potential benefits, risks and side effects of this procedure; the likelihood of the patient achieving goals; and potential problems that might occur during recuperation.  I discussed reasonable alternatives to the procedure, including risks, benefits and side effects related to the alternatives and risks related to not receiving this procedure.  We will proceed with procedure as planned.

## 2024-07-16 NOTE — TELEPHONE ENCOUNTER
Status post right ureteroscopy with laser lithotripsy, stone removal, stent insertion on 7/16/2024.    Please contact patient and schedule for office cystoscopy and right ureteral stent removal on 7/25 @ 11:30 AM. OK to double book slot.     Thanks,    ZH

## 2024-07-16 NOTE — OPERATIVE REPORT
[facility]  part of [facility]  Urology Operative Note         Angeli Smith Location: OR   [facility] [id number] MRN [id number]   Admission Date 7/16/2024 Operation Date 7/16/2024   Attending Physician Manas Alexis MD       Patient Name: Angeli Smith     Preoperative Diagnosis: Right ureteral stone [N20.1]     Postoperative Diagnosis: Right ureteral stone [N20.1]     Procedure(s): CYSTOSCOPY, RIGHT RETROGRADE PYELOGRAM, RIGHT URETEROSCOPY, LASER LITHOTRIPSY, STONE REMOVAL, RIGHT STENT INSERTION.    Primary Surgeon: Manas Alexis MD     Anesthesia: General ETT.    Specimen:   ID Type Source Tests Collected by Time Destination   1 : 1. right ureteral stone fragments Calculus Stone (calculus) CALCULI, URINARY, SURGICAL PATHOLOGY TISSUE Manas Alexis MD 7/16/2024  4:07 PM       Estimated Blood Loss: None.   Complications: None.     Indications for procedure: 64-year-old female who was diagnosed with a 6 mm right ureteral stone with associated hydronephrosis upon ER evaluation of flank pain, nausea and vomiting.  She was seen in the office after ER discharge.  Management options were reviewed with the patient who elected to proceed with the above-mentioned procedure.  Procedure discussed in detail including rationale, approach, benefits, risks, possible complications, and reasonable alternatives of treatment.  The expected postoperative course of recovery was reviewed with the patient.  We discussed risks including but not limited to medical and anesthetic complications, bleeding, infection, damage to surrounding organs or structures, and possible need for additional procedures.  We discussed the eventual need for stent removal once stone treatment is completed.  She verbalized understanding and wishes to proceed.    Surgical Findings: Right distal ureteral stone, completely fragmented using holmium laser.  Stone fragments removed.  Mild to moderate right hydronephrosis.  6  Welsh by 26 cm double-J right ureteral stent inserted.    Operative Summary:  The patient was brought to the operating room and identified by their wristband including their name, medical record number, and date of birth. They were transferred to the operating room table. Appropriate monitoring devices were connected to the patient. SCD's were applied to the bilateral lower extremities for DVT prophylaxis. Successful induction of general level endotracheal anesthesia was achieved. IV antibiotics were administered for surgical prophylaxis. The patient was then positioned in dorsal lithotomy with their legs in Romeo stirrups and all pressure areas and bony prominences padded appropriately. The surgical site was prepped and draped in standard sterile fashion. A full surgical timeout was performed with agreement upon all of its components.    A 22 Welsh cystoscope was inserted per urethra and advanced to the bladder.  Pan cystoscopy revealed no evidence of bladder tumors, masses, stones, or lesions.  UOs were orthotopic.  There was some blood at the right UO.  A 0.035 sensor guidewire was introduced into the right ureteral orifice.  The stone was in the distal right ureter.  I was meeting resistance at the level of the stone.  I introduced a 6 Welsh open-ended catheter over the guidewire into the right distal ureter below the stone.  Then, I was able to gently manipulate the wire past the stone to the level of the right kidney under fluoroscopic guidance.  The cystoscope and open-ended catheter were removed while maintaining the guidewire in place.  The guidewire was secured as a safety wire.  I then introduced a Storz semirigid ureteroscope alongside the guidewire into the bladder and then into the right ureter past the ureteral orifice.  I was easily able to identify the stone within the distal left ureter,  measuring about 6 mm.  A 365 µm [employer] holmium laser fiber was introduced and laser lithotripsy was performed  achieving complete fragmentation.  A 1.9 Sri Lankan 0 tip nitinol basket was introduced and the stone fragments were engaged, extracted, and deposited in the bladder for later retrieval.  All stone fragments were cleared.  I performed ureteroscopy to the right UPJ without evidence of any additional stones, lesions, or ureteral injury.    The ureteroscope was removed while maintaining the safety wire in place.  The safety wire was backloaded onto the cystoscope which was reintroduced into the bladder.  A 6 Sri Lankan open-ended catheter was inserted over the wire to the proximal right ureter.  The wire was removed.  Right retrograde pyelography was performed by introducing contrast gently into the collecting system.  This revealed mild to moderate right hydroureteronephrosis.  The collecting system appeared bifid.  No contrast extravasation.  The guidewire was replaced and the open-ended catheter was removed.  A [employer] 6 Sri Lankan by 26 cm double-J ureteral stent was inserted over the guidewire and advanced under fluoroscopic and visual guidance to the level of the right kidney.  The guidewire was removed.  Proximal and distal coils were noted.  The stent was then adjusted under fluoroscopic guidance so that the proximal coil is in a good position.  Excellent drainage was noted through the stent.    The bladder was emptied and the stone fragments were retrieved through the cystoscope and sent for chemical analysis.  The cystoscope was removed.  10 mL of 2% lidocaine jelly were instilled per urethra for postoperative analgesia.    This concluded the procedure.  Patient was repositioned supine, general anesthesia was reversed and she was successfully extubated and transported to the recovery room in a stable condition.  She tolerated the procedure well without any immediate complications.     Implants:   Implant Name Type Inv. Item Serial No.  Lot No. LRB No. Used Action   STENT URO [id number] PGTL SFT  HYDRPHLC COAT - SN/A  STENT URO [id number] PGTL SFT HYDRPHLC COAT N/A [employer] WD [id number] Right 1 Implanted        Drains: 6 x 26 right ureteral stent    Condition: Extubated and stable to PACU    I was present, scrubbed, and performed the procedure its entirety.  At patient's request, findings were discussed with her significant other following the procedure.      Manas Alexis MD

## 2024-07-16 NOTE — ANESTHESIA PREPROCEDURE EVALUATION
Anesthesia PreOp Note    HPI:     Angeli Smith is a 64 year old female who presents for preoperative consultation requested by: Manas Alexis MD    Date of Surgery: 2024    Procedure(s):  CYSTOSCOPY, RIGHT RETROGRADE PYELOGRAM, RIGHT URETEROSCOPY, LASER LITHOTRIPSY, STONE REMOVAL, RIGHT STENT INSERTION  CYSTOSCOPY URETEROSCOPY  LASER HOLMIUM LITHOTRIPSY  CYSTOSCOPY STENT INSERTION  Indication: Right ureteral stone [N20.1]    Relevant Problems   No relevant active problems       NPO:  Last Liquid Consumption Date: 07/15/24  Last Liquid Consumption Time:   Last Solid Consumption Date: 07/15/24  Last Solid Consumption Time:   Last Liquid Consumption Date: 07/15/24          History Review:  Patient Active Problem List    Diagnosis Date Noted    Right ureteral stone 07/15/2024    Ureteral stone with hydronephrosis 07/15/2024    Yeast infection 2023    Lichen sclerosus 10/09/2020    Asthma with COPD (HCC) 2016       Past Medical History:    Asthma (HCC)    Back problem    Borderline high blood pressure    Calculus of kidney    Diabetes (HCC)    oral controlled    Essential hypertension    Extrinsic asthma, unspecified    High blood pressure    High cholesterol    Pneumonia, organism unspecified(486)    Thyroid disease    Type II or unspecified type diabetes mellitus without mention of complication, not stated as uncontrolled    [id number]    Visual impairment       Past Surgical History:   Procedure Laterality Date    Appendectomy      Cholecystectomy      Colonoscopy  2013    Procedure: COLONOSCOPY;  Surgeon: Robert Casillas MD;  Location:  ENDOSCOPY    Lelo biopsy stereo nodule 1 site left (cpt=19081) Left 2021    benign    Lelo localization wire 1 site right (cpt=19281)            times 4    Other surgical history      Exc of Right Breast Mass       Medications Prior to Admission   Medication Sig Dispense Refill Last Dose    MOUNJARO 5 MG/0.5ML Subcutaneous  Solution Pen-injector Inject 5 mg into the skin once a week.   7/8/2024    ondansetron 4 MG Oral Tablet Dispersible Take 1 tablet (4 mg total) by mouth every 8 (eight) hours as needed for Nausea.   7/15/2024 at 1400    HYDROcodone-acetaminophen 7.5-325 MG Oral Tab Take 1-2 tablets by mouth every 6 (six) hours as needed for Pain. 20 tablet 0 7/16/2024 at 0800    tamsulosin (FLOMAX) 0.4 MG Oral Cap Take 1 capsule (0.4 mg total) by mouth daily for 7 days. 7 capsule 0 7/15/2024 at 1900    metFORMIN HCl 1000 MG Oral Tab Take 1 tablet (1,000 mg total) by mouth 2 (two) times daily.   7/15/2024 at 2030    levothyroxine 75 MCG Oral Tab Take 1 tablet (75 mcg total) by mouth daily.       levothyroxine 100 MCG Oral Tab Take 1 tablet (100 mcg total) by mouth before breakfast.   7/16/2024 at 0500    levothyroxine 20 mcg/mL in sodium chloride        Lisinopril 1 MG/ML Oral Solution lisinopril   7/15/2024 at 1900    atorvastatin 20 MG Oral Tab Take 1 tablet (20 mg total) by mouth daily.   7/14/2024    PROVENTIL  (90 Base) MCG/ACT Inhalation Aero Soln INHALE 2 PUFFS BY MOUTH FOUR TIMES DAILY AS NEEDED 1 Inhaler 6 7/15/2024 at 1900    estradiol 0.1 MG/GM Vaginal Cream Place 1 g vaginally twice a week. Apply nightly for 6 weeks followed by twice a week for maintenance therapy 42.5 g 1 Unknown    ADVAIR DISKUS 100-50 MCG/ACT Inhalation Aerosol Powder, Breath Activated Inhale 1 puff into the lungs 2 (two) times daily.   Unknown    montelukast 10 MG Oral Tab 1 strip by In Vitro route daily.   Unknown    TIM CONTOUR NEXT TEST In Vitro Strip TEST D  6     Montelukast Sodium (SINGULAIR OR) Take 10 mg by mouth daily.   Unknown     Current Facility-Administered Medications Ordered in Epic   Medication Dose Route Frequency Provider Last Rate Last Admin    acetaminophen (Tylenol Extra Strength) tab 1,000 mg  1,000 mg Oral Once Manas Alexis MD        sodium chloride 0.9% infusion   Intravenous Continuous Manas Alexis MD 20  mL/hr at 07/16/24 1354 New Bag at 07/16/24 1354     No current Epic-ordered outpatient medications on file.       Allergies   Allergen Reactions    Dextromethorphan HIVES    Iodine (Topical) SWELLING    Penicillin G HIVES     Per allergy test - has never had PCN - no blisters, no skin peeling, no organ dysfunction     Iodine Solution [Povidone Iodine]     Penicillins     Radiology Contrast Iodinated Dyes     Shellfish-Derived Products     Suphedrin [Pseudoephedrine]        Family History   Problem Relation Age of Onset    Diabetes Father     Diabetes Mother      Social History     Socioeconomic History    Marital status:    Tobacco Use    Smoking status: Former     Current packs/day: 0.30     Average packs/day: 0.3 packs/day for 10.0 years (3.0 ttl pk-yrs)     Types: Cigarettes    Smokeless tobacco: Never    Tobacco comments:     Social    Vaping Use    Vaping status: Never Used   Substance and Sexual Activity    Alcohol use: No    Drug use: No    Sexual activity: Yes     Partners: Male     Comment: menopause   Other Topics Concern    Caffeine Concern Yes     Comment: 1-2 cup off coffe daily     Exercise Yes     Comment: yoga    Seat Belt Yes       Available pre-op labs reviewed.  Lab Results   Component Value Date    WBC 15.0 (H) 07/13/2024    RBC 5.08 07/13/2024    HGB 15.2 07/13/2024    HCT 44.8 07/13/2024    MCV 88.2 07/13/2024    MCH 29.9 07/13/2024    MCHC 33.9 07/13/2024    RDW 12.9 07/13/2024    .0 07/13/2024     Lab Results   Component Value Date     07/13/2024    K 4.0 07/13/2024     07/13/2024    CO2 23.0 07/13/2024    BUN 12 07/13/2024    CREATSERUM 1.24 (H) 07/13/2024     (H) 07/13/2024    PGLU 149 (H) 07/16/2024    CA 8.8 07/13/2024     Lab Results   Component Value Date    INR 0.93 07/15/2024       Vital Signs:  Body mass index is 24.18 kg/m².   height is 1.727 m (5' 8\") and weight is 72.1 kg (159 lb). Her oral temperature is 97.8 °F (36.6 °C). Her blood pressure is  134/75 and her pulse is 82. Her respiration is 18 and oxygen saturation is 99%.   Vitals:    07/15/24 1827 07/16/24 1328   BP:  134/75   Pulse:  82   Resp:  18   Temp:  97.8 °F (36.6 °C)   TempSrc:  Oral   SpO2:  99%   Weight: 72.6 kg (160 lb) 72.1 kg (159 lb)   Height: 1.727 m (5' 8\") 1.727 m (5' 8\")        Anesthesia Evaluation      Airway   Mallampati: I  TM distance: >3 FB  Neck ROM: full  Dental      Pulmonary - normal exam   (+) COPD, asthma  Cardiovascular - normal exam  (+) hypertension    Neuro/Psych      GI/Hepatic/Renal      Endo/Other    (+) diabetes mellitus  Abdominal  - normal exam                 Anesthesia Plan:   ASA:  3  Plan:   General  Airway:  LMA  Informed Consent Plan and Risks Discussed With:  Patient      I have informed Angeli Smith and/or legal guardian or family member of the nature of the anesthetic plan, benefits, risks including possible dental damage if relevant, major complications, and any alternative forms of anesthetic management.   All of the patient's questions were answered to the best of my ability. The patient desires the anesthetic management as planned.  KYRA LARIOS MD  7/16/2024 2:54 PM  Present on Admission:  **None**

## 2024-07-16 NOTE — ANESTHESIA PROCEDURE NOTES
Airway  Date/Time: 7/16/2024 3:34 PM  Urgency: Elective      General Information and Staff    Patient location during procedure: OR  Anesthesiologist: Kojo Dorado MD  Performed: anesthesiologist   Performed by: Kojo Dorado MD  Authorized by: Kojo Dorado MD      Indications and Patient Condition  Indications for airway management: anesthesia  Spontaneous ventilation: present  Sedation level: deep  Preoxygenated: yes  Patient position: sniffing  Mask difficulty assessment: 1 - vent by mask    Final Airway Details  Final airway type: endotracheal airway      Successful airway: ETT  Cuffed: yes   Successful intubation technique: direct laryngoscopy  Endotracheal tube insertion site: oral  Blade: GlideScope  Blade size: #3  ETT size (mm): 7.0    Cormack-Lehane Classification: grade I - full view of glottis  Placement verified by: capnometry   Measured from: teeth  ETT to teeth (cm): 23  Number of attempts at approach: 1

## 2024-07-16 NOTE — ANESTHESIA POSTPROCEDURE EVALUATION
Patient: Angeli Smith    Procedure Summary       Date: 07/16/24 Room / Location: [facility] MAIN OR  / [facility] MAIN OR    Anesthesia Start: 1527 Anesthesia Stop:     Procedures:       CYSTOSCOPY, RIGHT RETROGRADE PYELOGRAM, RIGHT URETEROSCOPY, LASER LITHOTRIPSY, STONE REMOVAL, RIGHT STENT INSERTION (Right: Ureter)      CYSTOSCOPY URETEROSCOPY (Right: Ureter)      LASER HOLMIUM LITHOTRIPSY (Right: Ureter)      CYSTOSCOPY STENT INSERTION (Right: Ureter) Diagnosis:       Right ureteral stone      (Right ureteral stone [N20.1])    Surgeons: Manas Alexis MD Anesthesiologist: Kojo Dorado MD    Anesthesia Type: general ASA Status: 3            Anesthesia Type: general    Vitals Value Taken Time   /76 07/16/24 1642   Temp 97.7 °F (36.5 °C) 07/16/24 1642   Pulse 91 07/16/24 1642   Resp 18 07/16/24 1642   SpO2 99 % 07/16/24 1642   Vitals shown include unfiled device data.    [facility] AN Post Evaluation:   Patient Evaluated in PACU  Patient Participation: complete - patient participated  Level of Consciousness: awake  Pain Management: adequate  Airway Patency:patent  Dental exam unchanged from preop  Yes    Cardiovascular Status: acceptable  Respiratory Status: acceptable  Postoperative Hydration acceptable      KOJO DORADO MD  7/16/2024 4:42 PM

## 2024-07-16 NOTE — DISCHARGE INSTRUCTIONS
- Blood in the urine, burning with urination, and the urgency to urinate are normal and expected for 5-7 days.    - Make sure you drink enough water to keep the urine light pink to clear.    - Take the prescribed antibiotics as instructed for 5 days.    - Take the Pyridium (also called Azo and available over-the-counter) for burning with urination. This medication will make your urine orange in color.    - Dr. Alexis's office will contact you within the next few days to schedule you for cystoscopy and stent removal in the office within the next few weeks. If you do not receive a call within 5 business days, please call [phone] to schedule the appointment.     - Call or go to the ER for intractable pain, fevers >101 F, shaking chills, nausea and vomiting, chest pain or shortness of breath.    We wish you a safe, speedy, and uneventful recovery.      HOME INSTRUCTIONS  [facility] HOME CARE INSTRUCTIONS: POST-OP ANESTHESIA  The medication that you received for sedation or general anesthesia can last up to 24 hours. Your judgment and reflexes may be altered, even if you feel like your normal self.      We Recommend:   Do not drive any motor vehicle or bicycle   Avoid mowing the lawn, playing sports, or working with power tools/applicances (power saws, electric knives or mixers)   That you have someone stay with you on your first night home   Do not drink alcohol or take sleeping pills or tranquilizers   Do not sign legal documents within 24 hours of your procedure   If you had a nerve block for your surgery, take extra care not to put any pressure on your arm or hand for 24 hours    It is normal:  For you to have a sore throat if you had a breathing tube during surgery (while you were asleep!). The sore throat should get better within 48 hours. You can gargle with warm salt water (1/2 tsp in 4 oz warm water) or use a throat lozenge for comfort  To feel muscle aches or soreness especially in the abdomen, chest or  neck. The achy feeling should go away in the next 24 hours  To feel weak, sleepy or \"wiped out\". Your should start feeling better in the next 24 hours.   To experience mild discomforts such as sore lip or tongue, headache, cramps, gas pains or a bloated feeling in your abdomen.   To experience mild back pain or soreness for a day or two if you had spinal or epidural anesthesia.   If you had laparoscopic surgery, to feel shoulder pain or discomfort on the day of surgery.   For some patients to have nausea after surgery/anesthesia    If you feel nausea or experience vomiting:   Try to move around less.   Eat less than usual or drink only liquids until the next morning   Nausea should resolve in about 24 hours    If you have a problem when you are at home:    Call your surgeons office   Discharge Instructions: After Your Surgery  You’ve just had surgery. During surgery, you were given medicine called anesthesia to keep you relaxed and free of pain. After surgery, you may have some pain or nausea. This is common. Here are some tips for feeling better and getting well after surgery.   Going home  Your healthcare provider will show you how to take care of yourself when you go home. They'll also answer your questions. Have an adult family member or friend drive you home. For the first 24 hours after your surgery:   Don't drive or use heavy equipment.  Don't make important decisions or sign legal papers.  Take medicines as directed.  Don't drink alcohol.  Have someone stay with you, if needed. They can watch for problems and help keep you safe.  Be sure to go to all follow-up visits with your healthcare provider. And rest after your surgery for as long as your provider tells you to.   Coping with pain  If you have pain after surgery, pain medicine will help you feel better. Take it as directed, before pain becomes severe. Also, ask your healthcare provider or pharmacist about other ways to control pain. This might be with  heat, ice, or relaxation. And follow any other instructions your surgeon or nurse gives you.      Stay on schedule with your medicine.     Tips for taking pain medicine  To get the best relief possible, remember these points:   Pain medicines can upset your stomach. Taking them with a little food may help.  Most pain relievers taken by mouth need at least 20 to 30 minutes to start to work.  Don't wait till your pain becomes severe before you take your medicine. Try to time your medicine so that you can take it before starting an activity. This might be before you get dressed, go for a walk, or sit down for dinner.  Constipation is a common side effect of some pain medicines. Call your healthcare provider before taking any medicines such as laxatives or stool softeners to help ease constipation. Also ask if you should skip any foods. Drinking lots of fluids and eating foods such as fruits and vegetables that are high in fiber can also help. Remember, don't take laxatives unless your surgeon has prescribed them.  Drinking alcohol and taking pain medicine can cause dizziness and slow your breathing. It can even be deadly. Don't drink alcohol while taking pain medicine.  Pain medicine can make you react more slowly to things. Don't drive or run machinery while taking pain medicine.  Your healthcare provider may tell you to take acetaminophen to help ease your pain. Ask them how much you're supposed to take each day. Acetaminophen or other pain relievers may interact with your prescription medicines or other over-the-counter (OTC) medicines. Some prescription medicines have acetaminophen and other ingredients in them. Using both prescription and OTC acetaminophen for pain can cause you to accidentally overdose. Read the labels on your OTC medicines with care. This will help you to clearly know the list of ingredients, how much to take, and any warnings. It may also help you not take too much acetaminophen. If you have  questions or don't understand the information, ask your pharmacist or healthcare provider to explain it to you before you take the OTC medicine.   Managing nausea  Some people have an upset stomach (nausea) after surgery. This is often because of anesthesia, pain, or pain medicine, less movement of food in the stomach, or the stress of surgery. These tips will help you handle nausea and eat healthy foods as you get better. If you were on a special food plan before surgery, ask your healthcare provider if you should follow it while you get better. Check with your provider on how your eating should progress. It may depend on the surgery you had. These general tips may help:   Don't push yourself to eat. Your body will tell you when to eat and how much.  Start off with clear liquids and soup. They're easier to digest.  Next try semi-solid foods as you feel ready. These include mashed potatoes, applesauce, and gelatin.  Slowly move to solid foods. Don’t eat fatty, rich, or spicy foods at first.  Don't force yourself to have 3 large meals a day. Instead eat smaller amounts more often.  Take pain medicines with a small amount of solid food, such as crackers or toast. This helps prevent nausea.  When to call your healthcare provider  Call your healthcare provider right away if you have any of these:   You still have too much pain, or the pain gets worse, after taking the medicine. The medicine may not be strong enough. Or there may be a complication from the surgery.  You feel too sleepy, dizzy, or groggy. The medicine may be too strong.  Side effects such as nausea or vomiting. Your healthcare provider may advise taking other medicines to .  Skin changes such as rash, itching, or hives. This may mean you have an allergic reaction. Your provider may advise taking other medicines.  The incision looks different (for instance, part of it opens up).  Bleeding or fluid leaking from the incision site, and weren't told to expect  that.  Fever of 100.4°F (38°C) or higher, or as directed by your provider.  Call 911  Call 911 right away if you have:   Trouble breathing  Facial swelling    If you have obstructive sleep apnea   You were given anesthesia medicine during surgery to keep you comfortable and free of pain. After surgery, you may have more apnea spells because of this medicine and other medicines you were given. The spells may last longer than normal.    At home:  Keep using the continuous positive airway pressure (CPAP) device when you sleep. Unless your healthcare provider tells you not to, use it when you sleep, day or night. CPAP is a common device used to treat obstructive sleep apnea.  Talk with your provider before taking any pain medicine, muscle relaxants, or sedatives. Your provider will tell you about the possible dangers of taking these medicines.  Contact your provider if your sleeping changes a lot even when taking medicines as directed.  William last reviewed this educational content on 10/1/2021  © 1446-1620 [employer]. All rights reserved. This information is not intended as a substitute for professional medical care. Always follow your healthcare professional's instructions.

## 2024-07-17 NOTE — TELEPHONE ENCOUNTER
TCB I placed pt on the schedule as well at 11:50am pt MUST arrive at 11:10am will send pt a mychart.

## 2024-07-18 NOTE — TELEPHONE ENCOUNTER
Patient received message, but has additional questions. Please call after 9:00 am today at [phone],thanks.

## 2024-07-18 NOTE — TELEPHONE ENCOUNTER
I called pt verified name/ pt had questions regarding taking phenazopyridine she has not taken it I informed her it was to help relieve the burning sensation when pt urinate pt states she has no issues with burning or pain with urination she did take 1 imodium because she had some diarrhea today, pt did stop the hydrocodone states she will hold on to it. I confirmed pt procedure date and time she will follow up as scheduled and call if any questions or concerns.

## 2024-07-23 ENCOUNTER — TELEPHONE (OUTPATIENT)
Dept: SURGERY | Facility: CLINIC | Age: 64
End: 2024-07-23

## 2024-07-23 NOTE — TELEPHONE ENCOUNTER
I s/w pt and asked her if she has been on any ABX and she stated she just finished one. I explained that many women get yeast infections when they are on abx which can cause vaginal itching and discharge. She states she didn't notice any discharge. I suggested she could ask the advice of her PCP or her OB/Gyne. She should keep the area clean and dry and she can apply some barrier cream such as Vaseline or Aquaphor to sooth and protect the shin that it elsa. Pt verbalized understanding and was thankful.

## 2024-07-23 NOTE — TELEPHONE ENCOUNTER
Patient states that she has a STENT and is having vaginal itching and skin is raw and sore. Patient states that she has sensitive skin and wants to know what should she do? Patient states that she is a diabetic. Patient states that the STENT is supposed to be removed Thursday morning.

## 2024-07-25 ENCOUNTER — PROCEDURE (OUTPATIENT)
Dept: SURGERY | Facility: CLINIC | Age: 64
End: 2024-07-25
Payer: COMMERCIAL

## 2024-07-25 VITALS — HEART RATE: 86 BPM | SYSTOLIC BLOOD PRESSURE: 131 MMHG | DIASTOLIC BLOOD PRESSURE: 85 MMHG

## 2024-07-25 DIAGNOSIS — N20.1 RIGHT URETERAL STONE: Primary | ICD-10-CM

## 2024-07-25 PROCEDURE — 52310 CYSTOSCOPY AND TREATMENT: CPT | Performed by: UROLOGY

## 2024-07-25 RX ORDER — CIPROFLOXACIN 500 MG/1
500 TABLET, FILM COATED ORAL ONCE
Status: SHIPPED | OUTPATIENT
Start: 2024-07-25

## 2024-07-25 NOTE — PROGRESS NOTES
[facility]  Follow-Up Visit    HPI: Angeli Smith is a 64 year old female presents for a follow up visit. Patient was last seen on 7/15/2024.    INTERVAL HISTORY: Patient underwent right ureteroscopy with laser lithotripsy, stone removal, stent insertion on 7/16/2024 for management of a 6 mm obstructing right ureteral stone.    Stone analysis results are still pending.    Here for office cystoscopy and stent removal.  She reports mild stent related discomfort.  No fevers or chills.      1. Right Ureteral Stone with hydronephrosis  Patient with previous history of nephrolithiasis a few years ago where she passed the kidney stone.     She presented to the ER on 7/12/2024 with sudden onset severe, colicky right-sided flank pain that radiates to the loin.  It was associated with nausea and vomiting as well as gross hematuria.  She reported some chills but no fevers.     She was afebrile and hemodynamically stable in the ER.  CT abdomen pelvis without contrast revealed a 6 mm right proximal ureteral stone with upstream hydronephrosis.  Nonobstructing right kidney stone up to 3 mm.     Blood work revealed mild leukocytosis.  Normal BUN and creatinine.  Urinalysis with large blood but no clear signs of UTI.  She was discharged home on medical expulsive therapy.     She returned to the ER on 7/13/2024 with persistent pain, nausea, and vomiting.  Blood work revealed mild FARTUN with a creatinine of 1.24.  WBC count was 15 K without left shift.  Urinalysis with 2+ leuks, no nitrites, rare bacteria, >10 RBCs, 1-5 WBCs.     Urine culture negative.     Her pain was again controlled and she was discharged home with instructions for close follow-up.     She reports taking Norco pretty much around-the-clock.  She reports intermittent pain.  She has been straining her urine and has not noticed passing the stone.  No fevers.  Her hematuria has cleared.        PAST MEDICAL HISTORY: Diabetes.  HTN.  HLD.   Hypothyroidism.  Nephrolithiasis.  Asthma.  Nephrolithiasis.     PAST SURGICAL HISTORY: Cholecystectomy.  Appendectomy.  Right ureteroscopy with laser lithotripsy 7/2024.     SOCIAL HISTORY: .  She has 4 children.  She is a social smoker.  No alcohol.  Retired .     Reviewed past medical, surgical, family, and social history.  Reviewed med list and allergies.      REVIEW OF SYSTEMS:  Pertinent positives and negatives per HPI. A 10-point ROS was performed and is otherwise negative.       EXAM:  LMP  (LMP Unknown)     Physical Exam  Constitutional:       General: She is not in acute distress.     Appearance: She is well-developed.   HENT:      Head: Normocephalic.   Eyes:      General: No scleral icterus.  Cardiovascular:      Rate and Rhythm: Normal rate.   Pulmonary:      Effort: Pulmonary effort is normal.   Skin:     General: Skin is warm and dry.   Neurological:      Mental Status: She is alert and oriented to person, place, and time.   Psychiatric:         Mood and Affect: Mood normal.         Behavior: Behavior normal.       PATHOLOGY:  No results found.      LABS:  No results found.      IMAGING:  XR OR - N/C    Result Date: 7/16/2024  PROCEDURE: XR OR - N/C  COMPARISON: None.  INDICATIONS: CYSTOSCOPY, RIGHT RETROGRADE PYELOGRAM, RIGHT URETEROSCOPY, LASER LITHOTRIPSY, STONE REMOVAL, RIGHT STENT INSERTION  FINDINGS: Fluoroscopic assistance was provided for the ordering physician. A radiologist was not present during this examination.         CONCLUSION: Fluoroscopic guidance as above.  45.8-seconds of fluoroscopy time were used.  86-fluoroscopic images as well as 1-dose summary images are stored with this exam.  RADIATION DOSE (Dose Area Product):  2.8580-Gy*cm^2.   Dictated by (CST): Isaiah Morgan MD on 7/16/2024 at 9:43 PM     Finalized by (CST): Isaiah Morgan MD on 7/16/2024 at 9:44 PM          XR ABDOMEN (1 VIEW) (CPT=74018)    Result Date: 7/15/2024  PROCEDURE: XR ABDOMEN (1  VIEW) (CPT=74018)  COMPARISON: None.  INDICATIONS: Right ureteral stone x 3 days.  TECHNIQUE:   Single view.   FINDINGS:   No definitive calculus is identified in the proximal right ureter.  There are loops of bowel in this region, which limits evaluation.  There is a linear 9 mm area of calcification involving the lateral aspect of the right kidney, which is likely associated with a previously demonstrated cystic lesion on the prior CT abdomen dated 07/12/2024.  There is a phlebolith in the right hemipelvis.  There are surgical clips in the right upper quadrant of the abdomen.  Nonobstructive bowel gas pattern.  Multilevel degenerative changes of the lower thoracic and lumbar spine.  Mild multifocal degenerative change involving the osseous pelvis.         CONCLUSION:   No definitive calculus is identified in the proximal right ureter.  Linear 9 mm region of calcification overlying the peripheral aspect of the right kidney, which likely reflects mineralization associated with the previously demonstrated cystic lesion on the prior CT abdomen and pelvis dated 07/12/2024.  Lesser incidental findings described above.     Dictated by (CST): Shaan Daley MD on 7/15/2024 at 5:45 PM     Finalized by (CST): Shaan Daley MD on 7/15/2024 at 5:51 PM          CT ABDOMEN+PELVIS KIDNEYSTONE 2D RNDR(NO IV,NO ORAL)(CPT=74176)    Result Date: 7/12/2024  PROCEDURE:  CT ABDOMEN+PELVIS KIDNEYSTONE 2D RNDR(NO IV,NO ORAL)(CPT=74176)  COMPARISON:  [facility], CT, CT ABDOMEN+PELVIS KIDNEYSTONE 2D RNDR(NO IV,NO ORAL)(CPT=74176), 4/15/2022, 10:13 AM.  INDICATIONS:  right flank pain, nausea, vomiting  TECHNIQUE:  Unenhanced multislice CT scanning from above the kidneys to below the urinary bladder.  2D rendering are generated on the CT scanner workstation to localize potential stones in the cranio-caudal plane.  Dose reduction techniques were used. Dose information is transmitted to the ACR (American College of Radiology) NRDR (National  Radiology Data Registry) which includes the Dose Index Registry.  PATIENT STATED HISTORY: (As transcribed by Technologist)  Patient has acute RLQ and right flank pain, Hx of kidney stones.    FINDINGS:  Please note the exam is somewhat limited without intravenous or oral contrast.  KIDNEYS:  Mild right hydronephrosis is present.  This is likely due to a stone of the proximal right ureter measuring 6 mm.  Nonobstructing right renal stone measures up to 3 mm.  Probable there is a stable appearance of a possible cyst with milk of calcium present measuring up to 1.7 x 2.0 cm.  No specific further follow-up is suggested. BLADDER:  No mass, calculus or significant wall thickening. ADRENALS:  No mass or enlargement.  LIVER:  No enlargement, atrophy, abnormal density, or significant focal lesion.  BILIARY:  Postsurgical changes of cholecystectomy present. PANCREAS:  Pancreas appears atrophic which is stable.  SPLEEN:  No enlargement or focal lesion.  AORTA/VASCULAR:  No aneurysm.  RETROPERITONEUM:  No mass or adenopathy.  BOWEL/MESENTERY:  No dilated loops of small bowel are seen.  Portions of the bowel are decompressed, limiting evaluation.  Lack of oral contrast limits assessment.  No free fluid is seen. ABDOMINAL WALL:  Small umbilical hernia containing fat measures 1.2 x 1.7 cm. BONES:  No bony lesion or fracture. PELVIC ORGANS:  Unremarkable noncontrast appearance.  Please note that the uterus and ovaries are not well assessed with CT. LUNG BASES:  No visible pulmonary or pleural disease.  OTHER:  Negative.             CONCLUSION:  Mild right hydronephrosis is likely related to a stone of the proximal right ureter measuring up to 6 mm.  Additional nonobstructing right renal calculus.    LOCATION:  [id number]   Dictated by (CST): Maynor Sarmiento MD on 7/12/2024 at 8:24 AM     Finalized by (CST): Maynor Sarmiento MD on 7/12/2024 at 8:29 AM           UROLOGY PROCEDURE:    CYSTOURETHROSCOPY WITH STENT REMOVAL  Informed consent was  obtained for the procedure. The site was prepped and draped in the usual sterile fashion. An Ambu 16 French flexible cystoscope was utilized for the procedure.    Anesthesia:  2% lidocaine gel.    Urethra: Normal.    Prostate / Pelvic: Normal.    Bladder: Normal.  No tumor, stone, diverticulum, or glomerulation.    U.O's: Normal with ureteral stent emanating from the right UO.    The distal coil of the ureteral stent was visualized and grasped using flexible cystoscopic grasper.  The stent and cystoscope were then gently removed through the urethral meatus.  The stent was grossly identified to be intact and disposed off.  Patient tolerated the procedure well.    POST CYSTOSCOPY MEDICATIONS: sample one tablet Cipro 500 mg given to patient.    DIAGNOSIS: Cystoscopy with successful removal of right ureteral stent.      IMPRESSION & PLAN:  64 year old female status post right ureteroscopy with laser lithotripsy, stent insertion and stone removal on 7/16/2024 for management of a symptomatic 6 mm right ureteral stone.    Stent removed in the office today without incident.  Patient tolerated well.    General stone friendly dietary modifications reviewed with patient.    We will obtain a follow-up ultrasound of the kidneys to ensure resolution of hydronephrosis.  Patient will be notified of the results once available.    All questions answered.    She will return for follow-up as needed.      Manas Alexis MD  7/25/2024

## 2024-07-26 ENCOUNTER — TELEPHONE (OUTPATIENT)
Dept: SURGERY | Facility: CLINIC | Age: 64
End: 2024-07-26

## 2024-07-26 LAB
CAOX DIHYDRATE: 20 %
CAOX MONOHYDRATE: 80 %
WEIGHT-STONE: 44 MG

## 2024-07-26 NOTE — TELEPHONE ENCOUNTER
LMTCB 7/26        IMPRESSION & PLAN:  64 year old female status post right ureteroscopy with laser lithotripsy, stent insertion and stone removal on 7/16/2024 for management of a symptomatic 6 mm right ureteral stone.     Stent removed in the office today without incident.  Patient tolerated well.     General stone friendly dietary modifications reviewed with patient.     We will obtain a follow-up ultrasound of the kidneys to ensure resolution of hydronephrosis.  Patient will be notified of the results once available.     All questions answered.     She will return for follow-up as needed.        Manas Alexis MD  7/25/2024

## 2024-07-26 NOTE — TELEPHONE ENCOUNTER
Per patient she called to schedule an ultrasound and the first available is 8/16 and was told that they could get her in sooner if the order is changed to stat. Please advise

## 2024-07-26 NOTE — TELEPHONE ENCOUNTER
Called patient, verified name and . I let patient know that there is no reason why we will order the ultrasound as STAT.   Patient agreed, verbalized understandings and has no further questions.

## 2024-08-16 ENCOUNTER — HOSPITAL ENCOUNTER (OUTPATIENT)
Dept: ULTRASOUND IMAGING | Age: 64
Discharge: HOME | End: 2024-08-16
Attending: UROLOGY
Payer: COMMERCIAL

## 2024-08-16 DIAGNOSIS — N20.1 RIGHT URETERAL STONE: ICD-10-CM

## 2024-08-16 PROCEDURE — 76775 US EXAM ABDO BACK WALL LIM: CPT | Performed by: UROLOGY

## (undated) DEVICE — ENDOSCOPIC VALVE WITH ADAPTER.: Brand: SURSEAL® II

## (undated) DEVICE — SOLUTION IRRIG 3000ML 0.9% NACL FLX CONT

## (undated) DEVICE — GLOVE SUR 7.5 SENSICARE PI PIP CRM PWD F

## (undated) DEVICE — PAD,EYE,LARGE,2 1/8"X2 5/8",STERILE,LF: Brand: MEDLINE

## (undated) DEVICE — TOWEL,OR,DSP,ST,BLUE,DLX,2/PK,40PK/CS: Brand: MEDLINE

## (undated) DEVICE — CONTAINER,SPECIMEN,OR STERILE,4OZ: Brand: MEDLINE

## (undated) DEVICE — TECH ONLY - ADDITION

## (undated) DEVICE — CATHETER URET 5FR L70CM FLX OPN TIP NONPORTED

## (undated) DEVICE — SLEEVE COMPR MD KNEE LEN SGL USE KENDALL SCD

## (undated) DEVICE — SERVICE RENTAL LSR TECH ONLY

## (undated) DEVICE — PACK CUSTOM CYSTO

## (undated) DEVICE — GUIDEWIRE ENDOSCP L150CM DIA0.035IN TIP 3CM

## (undated) DEVICE — SOLUTION IRRIG 1000ML 0.9% NACL USP BTL

## (undated) DEVICE — NITINOL STONE RETRIEVAL BASKET: Brand: ZERO TIP

## (undated) DEVICE — UROLOGY DRAIN BAG

## (undated) DEVICE — FIBER LSR 365 MIC 365 DL DISP FOR HOLM LSR

## (undated) DEVICE — SNAP KOVER: Brand: UNBRANDED

## (undated) DEVICE — SOLUTION IRRIG 1000ML ST H2O AQUALITE PLAS

## (undated) DEVICE — MEDI-VAC NON-CONDUCTIVE SUCTION TUBING: Brand: CARDINAL HEALTH

## (undated) NOTE — LETTER
Date & Time: 12/19/2018, 10:56 AM  Patient: Andry Guevara  Encounter Provider(s):    Daisha Trivedi MD       To Whom It May Concern:    Joaquin Reardon was seen and treated in our department on 12/19/2018. She should not return to work until 12/22.     If

## (undated) NOTE — ED AVS SNAPSHOT
Nils Shows   MRN: [id number]    Department:  [facility] Emergency Department in [place]   Date of Visit:  12/19/2018           Disclosure     Insurance plans vary and the physician(s) referred by the ER may not be covered by your plan.  Please contac tell this physician (or your personal doctor if your instructions are to return to your personal doctor) about any new or lasting problems. The primary care or specialist physician will see patients referred from the [facility] Emergency Department.  Mla Renee